# Patient Record
Sex: MALE | Race: WHITE | NOT HISPANIC OR LATINO | Employment: PART TIME | URBAN - METROPOLITAN AREA
[De-identification: names, ages, dates, MRNs, and addresses within clinical notes are randomized per-mention and may not be internally consistent; named-entity substitution may affect disease eponyms.]

---

## 2017-10-26 ENCOUNTER — HOSPITAL ENCOUNTER (INPATIENT)
Facility: MEDICAL CENTER | Age: 47
LOS: 2 days | DRG: 314 | End: 2017-10-29
Attending: EMERGENCY MEDICINE | Admitting: HOSPITALIST
Payer: MEDICAID

## 2017-10-26 ENCOUNTER — RESOLUTE PROFESSIONAL BILLING HOSPITAL PROF FEE (OUTPATIENT)
Dept: HOSPITALIST | Facility: MEDICAL CENTER | Age: 47
End: 2017-10-26
Payer: MEDICAID

## 2017-10-26 ENCOUNTER — APPOINTMENT (OUTPATIENT)
Dept: RADIOLOGY | Facility: MEDICAL CENTER | Age: 47
DRG: 314 | End: 2017-10-26
Attending: EMERGENCY MEDICINE
Payer: MEDICAID

## 2017-10-26 DIAGNOSIS — R06.02 SOB (SHORTNESS OF BREATH): ICD-10-CM

## 2017-10-26 DIAGNOSIS — R07.9 CHEST PAIN, UNSPECIFIED TYPE: ICD-10-CM

## 2017-10-26 LAB
ALBUMIN SERPL BCP-MCNC: 4.1 G/DL (ref 3.2–4.9)
ALBUMIN/GLOB SERPL: 1.3 G/DL
ALP SERPL-CCNC: 259 U/L (ref 30–99)
ALT SERPL-CCNC: 129 U/L (ref 2–50)
ANION GAP SERPL CALC-SCNC: 15 MMOL/L (ref 0–11.9)
AST SERPL-CCNC: 122 U/L (ref 12–45)
BASOPHILS # BLD AUTO: 1 % (ref 0–1.8)
BASOPHILS # BLD: 0.06 K/UL (ref 0–0.12)
BILIRUB SERPL-MCNC: 2.4 MG/DL (ref 0.1–1.5)
BNP SERPL-MCNC: 281 PG/ML (ref 0–100)
BUN SERPL-MCNC: 18 MG/DL (ref 8–22)
CALCIUM SERPL-MCNC: 9.1 MG/DL (ref 8.5–10.5)
CHLORIDE SERPL-SCNC: 104 MMOL/L (ref 96–112)
CO2 SERPL-SCNC: 18 MMOL/L (ref 20–33)
CREAT SERPL-MCNC: 0.78 MG/DL (ref 0.5–1.4)
EKG IMPRESSION: NORMAL
EKG IMPRESSION: NORMAL
EOSINOPHIL # BLD AUTO: 0.1 K/UL (ref 0–0.51)
EOSINOPHIL NFR BLD: 1.6 % (ref 0–6.9)
ERYTHROCYTE [DISTWIDTH] IN BLOOD BY AUTOMATED COUNT: 40.8 FL (ref 35.9–50)
EST. AVERAGE GLUCOSE BLD GHB EST-MCNC: 117 MG/DL
GFR SERPL CREATININE-BSD FRML MDRD: >60 ML/MIN/1.73 M 2
GLOBULIN SER CALC-MCNC: 3.2 G/DL (ref 1.9–3.5)
GLUCOSE SERPL-MCNC: 143 MG/DL (ref 65–99)
HAV IGM SERPL QL IA: NEGATIVE
HBA1C MFR BLD: 5.7 % (ref 0–5.6)
HBV CORE IGM SER QL: NEGATIVE
HBV SURFACE AG SER QL: NEGATIVE
HCT VFR BLD AUTO: 36.6 % (ref 42–52)
HCV AB SER QL: REACTIVE
HGB BLD-MCNC: 12.9 G/DL (ref 14–18)
IMM GRANULOCYTES # BLD AUTO: 0.01 K/UL (ref 0–0.11)
IMM GRANULOCYTES NFR BLD AUTO: 0.2 % (ref 0–0.9)
LIPASE SERPL-CCNC: 58 U/L (ref 11–82)
LYMPHOCYTES # BLD AUTO: 1.16 K/UL (ref 1–4.8)
LYMPHOCYTES NFR BLD: 18.9 % (ref 22–41)
MAGNESIUM SERPL-MCNC: 1.5 MG/DL (ref 1.5–2.5)
MCH RBC QN AUTO: 34.1 PG (ref 27–33)
MCHC RBC AUTO-ENTMCNC: 35.2 G/DL (ref 33.7–35.3)
MCV RBC AUTO: 96.8 FL (ref 81.4–97.8)
MONOCYTES # BLD AUTO: 0.62 K/UL (ref 0–0.85)
MONOCYTES NFR BLD AUTO: 10.1 % (ref 0–13.4)
NEUTROPHILS # BLD AUTO: 4.2 K/UL (ref 1.82–7.42)
NEUTROPHILS NFR BLD: 68.2 % (ref 44–72)
NRBC # BLD AUTO: 0 K/UL
NRBC BLD AUTO-RTO: 0 /100 WBC
PLATELET # BLD AUTO: 183 K/UL (ref 164–446)
PMV BLD AUTO: 9.4 FL (ref 9–12.9)
POTASSIUM SERPL-SCNC: 3.7 MMOL/L (ref 3.6–5.5)
PROT SERPL-MCNC: 7.3 G/DL (ref 6–8.2)
RBC # BLD AUTO: 3.78 M/UL (ref 4.7–6.1)
SODIUM SERPL-SCNC: 137 MMOL/L (ref 135–145)
TROPONIN I SERPL-MCNC: 0.04 NG/ML (ref 0–0.04)
TROPONIN I SERPL-MCNC: 0.05 NG/ML (ref 0–0.04)
TROPONIN I SERPL-MCNC: 0.05 NG/ML (ref 0–0.04)
TSH SERPL DL<=0.005 MIU/L-ACNC: 1.62 UIU/ML (ref 0.3–3.7)
WBC # BLD AUTO: 6.2 K/UL (ref 4.8–10.8)

## 2017-10-26 PROCEDURE — 700101 HCHG RX REV CODE 250: Performed by: FAMILY MEDICINE

## 2017-10-26 PROCEDURE — 83036 HEMOGLOBIN GLYCOSYLATED A1C: CPT

## 2017-10-26 PROCEDURE — 83880 ASSAY OF NATRIURETIC PEPTIDE: CPT

## 2017-10-26 PROCEDURE — 700102 HCHG RX REV CODE 250 W/ 637 OVERRIDE(OP): Performed by: EMERGENCY MEDICINE

## 2017-10-26 PROCEDURE — 93005 ELECTROCARDIOGRAM TRACING: CPT

## 2017-10-26 PROCEDURE — 71010 DX-CHEST-PORTABLE (1 VIEW): CPT

## 2017-10-26 PROCEDURE — 36415 COLL VENOUS BLD VENIPUNCTURE: CPT

## 2017-10-26 PROCEDURE — 84443 ASSAY THYROID STIM HORMONE: CPT

## 2017-10-26 PROCEDURE — 99285 EMERGENCY DEPT VISIT HI MDM: CPT

## 2017-10-26 PROCEDURE — A9270 NON-COVERED ITEM OR SERVICE: HCPCS

## 2017-10-26 PROCEDURE — 71275 CT ANGIOGRAPHY CHEST: CPT

## 2017-10-26 PROCEDURE — 700102 HCHG RX REV CODE 250 W/ 637 OVERRIDE(OP): Performed by: FAMILY MEDICINE

## 2017-10-26 PROCEDURE — 700111 HCHG RX REV CODE 636 W/ 250 OVERRIDE (IP): Performed by: FAMILY MEDICINE

## 2017-10-26 PROCEDURE — 96375 TX/PRO/DX INJ NEW DRUG ADDON: CPT

## 2017-10-26 PROCEDURE — 85025 COMPLETE CBC W/AUTO DIFF WBC: CPT

## 2017-10-26 PROCEDURE — 700111 HCHG RX REV CODE 636 W/ 250 OVERRIDE (IP): Performed by: HOSPITALIST

## 2017-10-26 PROCEDURE — 87522 HEPATITIS C REVRS TRNSCRPJ: CPT

## 2017-10-26 PROCEDURE — 93005 ELECTROCARDIOGRAM TRACING: CPT | Performed by: HOSPITALIST

## 2017-10-26 PROCEDURE — G0378 HOSPITAL OBSERVATION PER HR: HCPCS

## 2017-10-26 PROCEDURE — 93010 ELECTROCARDIOGRAM REPORT: CPT | Mod: 76 | Performed by: INTERNAL MEDICINE

## 2017-10-26 PROCEDURE — 700102 HCHG RX REV CODE 250 W/ 637 OVERRIDE(OP)

## 2017-10-26 PROCEDURE — 76705 ECHO EXAM OF ABDOMEN: CPT

## 2017-10-26 PROCEDURE — 96365 THER/PROPH/DIAG IV INF INIT: CPT

## 2017-10-26 PROCEDURE — 83690 ASSAY OF LIPASE: CPT

## 2017-10-26 PROCEDURE — HZ2ZZZZ DETOXIFICATION SERVICES FOR SUBSTANCE ABUSE TREATMENT: ICD-10-PCS | Performed by: HOSPITALIST

## 2017-10-26 PROCEDURE — A9270 NON-COVERED ITEM OR SERVICE: HCPCS | Performed by: HOSPITALIST

## 2017-10-26 PROCEDURE — A9270 NON-COVERED ITEM OR SERVICE: HCPCS | Performed by: FAMILY MEDICINE

## 2017-10-26 PROCEDURE — 99220 PR INITIAL OBSERVATION CARE,LEVL III: CPT | Performed by: HOSPITALIST

## 2017-10-26 PROCEDURE — 83735 ASSAY OF MAGNESIUM: CPT

## 2017-10-26 PROCEDURE — 93005 ELECTROCARDIOGRAM TRACING: CPT | Performed by: EMERGENCY MEDICINE

## 2017-10-26 PROCEDURE — 700102 HCHG RX REV CODE 250 W/ 637 OVERRIDE(OP): Performed by: HOSPITALIST

## 2017-10-26 PROCEDURE — 96376 TX/PRO/DX INJ SAME DRUG ADON: CPT

## 2017-10-26 PROCEDURE — 80053 COMPREHEN METABOLIC PANEL: CPT

## 2017-10-26 PROCEDURE — A9270 NON-COVERED ITEM OR SERVICE: HCPCS | Performed by: EMERGENCY MEDICINE

## 2017-10-26 PROCEDURE — 94760 N-INVAS EAR/PLS OXIMETRY 1: CPT

## 2017-10-26 PROCEDURE — 700117 HCHG RX CONTRAST REV CODE 255: Performed by: EMERGENCY MEDICINE

## 2017-10-26 PROCEDURE — 84484 ASSAY OF TROPONIN QUANT: CPT | Mod: 91

## 2017-10-26 PROCEDURE — 96366 THER/PROPH/DIAG IV INF ADDON: CPT

## 2017-10-26 PROCEDURE — 700105 HCHG RX REV CODE 258: Performed by: FAMILY MEDICINE

## 2017-10-26 PROCEDURE — 80074 ACUTE HEPATITIS PANEL: CPT

## 2017-10-26 RX ORDER — BISACODYL 10 MG
10 SUPPOSITORY, RECTAL RECTAL
Status: DISCONTINUED | OUTPATIENT
Start: 2017-10-26 | End: 2017-10-29 | Stop reason: HOSPADM

## 2017-10-26 RX ORDER — ASPIRIN 325 MG
325 TABLET ORAL DAILY
Status: DISCONTINUED | OUTPATIENT
Start: 2017-10-26 | End: 2017-10-28

## 2017-10-26 RX ORDER — ASPIRIN 81 MG/1
324 TABLET, CHEWABLE ORAL DAILY
Status: DISCONTINUED | OUTPATIENT
Start: 2017-10-26 | End: 2017-10-28

## 2017-10-26 RX ORDER — ASPIRIN 81 MG/1
81 TABLET, CHEWABLE ORAL DAILY
COMMUNITY

## 2017-10-26 RX ORDER — AMOXICILLIN 250 MG
2 CAPSULE ORAL 2 TIMES DAILY
Status: DISCONTINUED | OUTPATIENT
Start: 2017-10-26 | End: 2017-10-29 | Stop reason: HOSPADM

## 2017-10-26 RX ORDER — MORPHINE SULFATE 4 MG/ML
4 INJECTION, SOLUTION INTRAMUSCULAR; INTRAVENOUS
Status: DISCONTINUED | OUTPATIENT
Start: 2017-10-26 | End: 2017-10-29 | Stop reason: HOSPADM

## 2017-10-26 RX ORDER — ONDANSETRON 2 MG/ML
4 INJECTION INTRAMUSCULAR; INTRAVENOUS EVERY 4 HOURS PRN
Status: DISCONTINUED | OUTPATIENT
Start: 2017-10-26 | End: 2017-10-29 | Stop reason: HOSPADM

## 2017-10-26 RX ORDER — LORAZEPAM 2 MG/ML
1 INJECTION INTRAMUSCULAR
Status: DISCONTINUED | OUTPATIENT
Start: 2017-10-26 | End: 2017-10-29 | Stop reason: HOSPADM

## 2017-10-26 RX ORDER — THIAMINE MONONITRATE (VIT B1) 100 MG
50 TABLET ORAL DAILY
Status: DISCONTINUED | OUTPATIENT
Start: 2017-10-26 | End: 2017-10-29 | Stop reason: HOSPADM

## 2017-10-26 RX ORDER — PROMETHAZINE HYDROCHLORIDE 25 MG/1
12.5-25 TABLET ORAL EVERY 4 HOURS PRN
Status: DISCONTINUED | OUTPATIENT
Start: 2017-10-26 | End: 2017-10-29 | Stop reason: HOSPADM

## 2017-10-26 RX ORDER — ACETAMINOPHEN 325 MG/1
650 TABLET ORAL EVERY 6 HOURS PRN
Status: DISCONTINUED | OUTPATIENT
Start: 2017-10-26 | End: 2017-10-29 | Stop reason: HOSPADM

## 2017-10-26 RX ORDER — TEMAZEPAM 15 MG/1
15 CAPSULE ORAL
Status: DISCONTINUED | OUTPATIENT
Start: 2017-10-26 | End: 2017-10-29 | Stop reason: HOSPADM

## 2017-10-26 RX ORDER — LORAZEPAM 2 MG/ML
1.5 INJECTION INTRAMUSCULAR
Status: DISCONTINUED | OUTPATIENT
Start: 2017-10-26 | End: 2017-10-29 | Stop reason: HOSPADM

## 2017-10-26 RX ORDER — FOLIC ACID 1 MG/1
1 TABLET ORAL DAILY
Status: DISCONTINUED | OUTPATIENT
Start: 2017-10-26 | End: 2017-10-29 | Stop reason: HOSPADM

## 2017-10-26 RX ORDER — LORAZEPAM 1 MG/1
1 TABLET ORAL EVERY 4 HOURS PRN
Status: DISCONTINUED | OUTPATIENT
Start: 2017-10-26 | End: 2017-10-29 | Stop reason: HOSPADM

## 2017-10-26 RX ORDER — POLYETHYLENE GLYCOL 3350 17 G/17G
1 POWDER, FOR SOLUTION ORAL
Status: DISCONTINUED | OUTPATIENT
Start: 2017-10-26 | End: 2017-10-29 | Stop reason: HOSPADM

## 2017-10-26 RX ORDER — LORAZEPAM 1 MG/1
4 TABLET ORAL
Status: DISCONTINUED | OUTPATIENT
Start: 2017-10-26 | End: 2017-10-29 | Stop reason: HOSPADM

## 2017-10-26 RX ORDER — NITROGLYCERIN 0.4 MG/1
0.4 TABLET SUBLINGUAL
Status: DISCONTINUED | OUTPATIENT
Start: 2017-10-26 | End: 2017-10-29 | Stop reason: HOSPADM

## 2017-10-26 RX ORDER — LORAZEPAM 0.5 MG/1
0.5 TABLET ORAL EVERY 4 HOURS PRN
Status: DISCONTINUED | OUTPATIENT
Start: 2017-10-26 | End: 2017-10-29 | Stop reason: HOSPADM

## 2017-10-26 RX ORDER — OXYCODONE HYDROCHLORIDE 5 MG/1
5 TABLET ORAL
Status: DISCONTINUED | OUTPATIENT
Start: 2017-10-26 | End: 2017-10-29 | Stop reason: HOSPADM

## 2017-10-26 RX ORDER — NICOTINE 21 MG/24HR
14 PATCH, TRANSDERMAL 24 HOURS TRANSDERMAL
Status: DISCONTINUED | OUTPATIENT
Start: 2017-10-27 | End: 2017-10-29 | Stop reason: HOSPADM

## 2017-10-26 RX ORDER — METOPROLOL TARTRATE 1 MG/ML
5 INJECTION, SOLUTION INTRAVENOUS ONCE
Status: COMPLETED | OUTPATIENT
Start: 2017-10-26 | End: 2017-10-26

## 2017-10-26 RX ORDER — LORAZEPAM 2 MG/ML
2 INJECTION INTRAMUSCULAR
Status: DISCONTINUED | OUTPATIENT
Start: 2017-10-26 | End: 2017-10-29 | Stop reason: HOSPADM

## 2017-10-26 RX ORDER — ONDANSETRON 4 MG/1
4 TABLET, ORALLY DISINTEGRATING ORAL EVERY 4 HOURS PRN
Status: DISCONTINUED | OUTPATIENT
Start: 2017-10-26 | End: 2017-10-29 | Stop reason: HOSPADM

## 2017-10-26 RX ORDER — LORAZEPAM 2 MG/ML
0.5 INJECTION INTRAMUSCULAR EVERY 4 HOURS PRN
Status: DISCONTINUED | OUTPATIENT
Start: 2017-10-26 | End: 2017-10-29 | Stop reason: HOSPADM

## 2017-10-26 RX ORDER — LORAZEPAM 1 MG/1
2 TABLET ORAL
Status: DISCONTINUED | OUTPATIENT
Start: 2017-10-26 | End: 2017-10-29 | Stop reason: HOSPADM

## 2017-10-26 RX ORDER — LORAZEPAM 1 MG/1
3 TABLET ORAL
Status: DISCONTINUED | OUTPATIENT
Start: 2017-10-26 | End: 2017-10-29 | Stop reason: HOSPADM

## 2017-10-26 RX ORDER — OXYCODONE HYDROCHLORIDE 10 MG/1
10 TABLET ORAL
Status: DISCONTINUED | OUTPATIENT
Start: 2017-10-26 | End: 2017-10-29 | Stop reason: HOSPADM

## 2017-10-26 RX ORDER — PROMETHAZINE HYDROCHLORIDE 25 MG/1
12.5-25 SUPPOSITORY RECTAL EVERY 4 HOURS PRN
Status: DISCONTINUED | OUTPATIENT
Start: 2017-10-26 | End: 2017-10-29 | Stop reason: HOSPADM

## 2017-10-26 RX ORDER — LISINOPRIL 10 MG/1
5 TABLET ORAL
Status: DISCONTINUED | OUTPATIENT
Start: 2017-10-26 | End: 2017-10-27

## 2017-10-26 RX ORDER — ASPIRIN 300 MG/1
300 SUPPOSITORY RECTAL DAILY
Status: DISCONTINUED | OUTPATIENT
Start: 2017-10-26 | End: 2017-10-28

## 2017-10-26 RX ORDER — ALBUTEROL SULFATE 90 UG/1
2 AEROSOL, METERED RESPIRATORY (INHALATION) EVERY 4 HOURS PRN
Status: DISCONTINUED | OUTPATIENT
Start: 2017-10-26 | End: 2017-10-29 | Stop reason: HOSPADM

## 2017-10-26 RX ADMIN — LISINOPRIL 5 MG: 10 TABLET ORAL at 20:14

## 2017-10-26 RX ADMIN — ASPIRIN 325 MG: 325 TABLET, DELAYED RELEASE ORAL at 18:24

## 2017-10-26 RX ADMIN — IOHEXOL 100 ML: 350 INJECTION, SOLUTION INTRAVENOUS at 16:52

## 2017-10-26 RX ADMIN — LORAZEPAM 1 MG: 1 TABLET ORAL at 21:52

## 2017-10-26 RX ADMIN — ASPIRIN 325 MG: 325 TABLET, FILM COATED ORAL at 18:30

## 2017-10-26 RX ADMIN — FOLIC ACID 1 MG: 1 TABLET ORAL at 20:14

## 2017-10-26 RX ADMIN — NITROGLYCERIN 0.4 MG: 0.4 TABLET SUBLINGUAL at 23:02

## 2017-10-26 RX ADMIN — MORPHINE SULFATE 4 MG: 4 INJECTION INTRAVENOUS at 23:14

## 2017-10-26 RX ADMIN — NITROGLYCERIN 0.4 MG: 0.4 TABLET SUBLINGUAL at 22:51

## 2017-10-26 RX ADMIN — METOPROLOL TARTRATE 5 MG: 5 INJECTION INTRAVENOUS at 21:52

## 2017-10-26 RX ADMIN — ALBUTEROL SULFATE 2 PUFF: 90 AEROSOL, METERED RESPIRATORY (INHALATION) at 22:21

## 2017-10-26 RX ADMIN — Medication 50 MG: at 20:14

## 2017-10-26 RX ADMIN — MORPHINE SULFATE 4 MG: 4 INJECTION INTRAVENOUS at 20:15

## 2017-10-26 RX ADMIN — ONDANSETRON 4 MG: 2 INJECTION, SOLUTION INTRAMUSCULAR; INTRAVENOUS at 23:18

## 2017-10-26 RX ADMIN — NITROGLYCERIN 0.4 MG: 0.4 TABLET SUBLINGUAL at 23:11

## 2017-10-26 RX ADMIN — THERA TABS 1 TABLET: TAB at 20:15

## 2017-10-26 RX ADMIN — FOLIC ACID: 5 INJECTION, SOLUTION INTRAMUSCULAR; INTRAVENOUS; SUBCUTANEOUS at 22:17

## 2017-10-26 ASSESSMENT — LIFESTYLE VARIABLES
TOTAL SCORE: 0
TOTAL SCORE: 0
CONSUMPTION TOTAL: POSITIVE
ANXIETY: *
AVERAGE NUMBER OF DAYS PER WEEK YOU HAVE A DRINK CONTAINING ALCOHOL: 4
EVER FELT BAD OR GUILTY ABOUT YOUR DRINKING: NO
ANXIETY: *
TOTAL SCORE: 8
ORIENTATION AND CLOUDING OF SENSORIUM: ORIENTED AND CAN DO SERIAL ADDITIONS
TOTAL SCORE: 9
VISUAL DISTURBANCES: NOT PRESENT
EVER HAD A DRINK FIRST THING IN THE MORNING TO STEADY YOUR NERVES TO GET RID OF A HANGOVER: NO
HAVE YOU EVER FELT YOU SHOULD CUT DOWN ON YOUR DRINKING: NO
AGITATION: NORMAL ACTIVITY
AUDITORY DISTURBANCES: NOT PRESENT
HEADACHE, FULLNESS IN HEAD: VERY MILD
HEADACHE, FULLNESS IN HEAD: NOT PRESENT
VISUAL DISTURBANCES: NOT PRESENT
EVER_SMOKED: NEVER
AGITATION: NORMAL ACTIVITY
AUDITORY DISTURBANCES: NOT PRESENT
ON A TYPICAL DAY WHEN YOU DRINK ALCOHOL HOW MANY DRINKS DO YOU HAVE: 5
ALCOHOL_USE: YES
ORIENTATION AND CLOUDING OF SENSORIUM: ORIENTED AND CAN DO SERIAL ADDITIONS
HOW MANY TIMES IN THE PAST YEAR HAVE YOU HAD 5 OR MORE DRINKS IN A DAY: 3
EVER_SMOKED: NEVER
TREMOR: *
EVER_SMOKED: YES
DO YOU DRINK ALCOHOL: YES
TREMOR: TREMOR NOT VISIBLE BUT CAN BE FELT, FINGERTIP TO FINGERTIP
PAROXYSMAL SWEATS: BEADS OF SWEAT OBVIOUS ON FOREHEAD
PAROXYSMAL SWEATS: *
ALCOHOL_AMOUNT: 5
NAUSEA AND VOMITING: MILD NAUSEA WITH NO VOMITING
HAVE PEOPLE ANNOYED YOU BY CRITICIZING YOUR DRINKING: NO
TOTAL SCORE: 0
NAUSEA AND VOMITING: NO NAUSEA AND NO VOMITING

## 2017-10-26 ASSESSMENT — ENCOUNTER SYMPTOMS
HEMOPTYSIS: 1
NAUSEA: 1
CHILLS: 1
SPUTUM PRODUCTION: 1
ABDOMINAL PAIN: 0
SHORTNESS OF BREATH: 1
FEVER: 0
VOMITING: 1
DIAPHORESIS: 0
NECK PAIN: 1
COUGH: 1

## 2017-10-26 ASSESSMENT — PATIENT HEALTH QUESTIONNAIRE - PHQ9
SUM OF ALL RESPONSES TO PHQ QUESTIONS 1-9: 0
2. FEELING DOWN, DEPRESSED, IRRITABLE, OR HOPELESS: NOT AT ALL
SUM OF ALL RESPONSES TO PHQ9 QUESTIONS 1 AND 2: 0
1. LITTLE INTEREST OR PLEASURE IN DOING THINGS: NOT AT ALL

## 2017-10-26 ASSESSMENT — PAIN SCALES - GENERAL
PAINLEVEL_OUTOF10: 5
PAINLEVEL_OUTOF10: 3
PAINLEVEL_OUTOF10: 6
PAINLEVEL_OUTOF10: 7
PAINLEVEL_OUTOF10: 8
PAINLEVEL_OUTOF10: 7

## 2017-10-26 ASSESSMENT — COPD QUESTIONNAIRES
DURING THE PAST 4 WEEKS HOW MUCH DID YOU FEEL SHORT OF BREATH: MOST  OR ALL OF THE TIME
HAVE YOU SMOKED AT LEAST 100 CIGARETTES IN YOUR ENTIRE LIFE: NO/DON'T KNOW
DO YOU EVER COUGH UP ANY MUCUS OR PHLEGM?: YES, A FEW DAYS A WEEK OR MONTH
COPD SCREENING SCORE: 4

## 2017-10-26 NOTE — ED PROVIDER NOTES
ED Provider Note    Scribed for Trevin Sheikh M.D. by Girish Santana. 10/26/2017, 3:02 PM.    Means of arrival: Walk-in  History obtained from: Patient  History limited by: None    CHIEF COMPLAINT  Chief Complaint   Patient presents with   • Shortness of Breath       HPI  Kentrell Li is a 47 y.o. male who presents to the Emergency Department complaining of fatigue onset  three to four weeks ago three to four weeks ago Shortly followed by the onset of shortness of breath. He states that his shortness of breath is exacerbated by walking and even walking between rooms is difficult. He has a history of asthma and uses his step daughter's nebulizer symptomatically. He states that he has been using the nebulizer for his current symptoms and only experiences alleviation of the shortness of breath for about an hour per treatment. Patient then began to experience constant chest pain onset three weeks ago. He states that sitting down alleviates his chest pain and is worse with walking and accompanied by his shortness of breath. His chest pain radiates to his neck. The patient reports associated vomiting, nausea, productive cough, and hemoptysis. The sputum from his coughing is white and he is only producing specks of blood. He denies any upper extremity pain, diaphoresis, fever, chills, lower extremity pain or swelling, or abdominal pain. His last breathing treatment was several hours ago with good alleviation of his symptoms, including his chest pain. He states that he had a minor myocardial infarction in the past as indicated by an EKG.    REVIEW OF SYSTEMS  Review of Systems   Constitutional: Positive for chills and malaise/fatigue. Negative for diaphoresis and fever.   Respiratory: Positive for cough, hemoptysis, sputum production and shortness of breath.    Cardiovascular: Positive for chest pain. Negative for leg swelling. Claudication: or pain.   Gastrointestinal: Positive for nausea and vomiting. Negative  "for abdominal pain.   Musculoskeletal: Positive for neck pain.        Negative for upper extremity pain.   All other systems reviewed and are negative.  C    PAST MEDICAL HISTORY   has a past medical history of Asthma; Congestive heart failure (CMS-HCC); and Pancreatitis.    SURGICAL HISTORY   has a past surgical history that includes other abdominal surgery and other orthopedic surgery.    SOCIAL HISTORY  Social History   Substance Use Topics   • Smoking status: Never Smoker   • Smokeless tobacco: Current User     Types: Chew   • Alcohol use Yes      Comment: every other day      History   Drug Use No       FAMILY HISTORY  History reviewed. No pertinent family history.    CURRENT MEDICATIONS  No current facility-administered medications on file prior to encounter.      No current outpatient prescriptions on file prior to encounter.       ALLERGIES  No Known Allergies    PHYSICAL EXAM  VITAL SIGNS: /96   Pulse (!) 126   Temp 37.6 °C (99.6 °F)   Resp 16   Ht 1.778 m (5' 10\")   Wt 76.2 kg (168 lb)   SpO2 94%   BMI 24.11 kg/m²     Constitutional:   No acute distress  HENT:  Moist mucous membranes  Eyes:  No conjunctivitis or icterus  Neck:  trachea is midline, no palpable thyroid  Lymphatic:  No cervical lymphadenopathy  Cardiovascular:  Tachycardic, no murmur  Thorax & Lungs:  Normal breath sounds, no rhonchi  Abdomen:  Soft, Non-tender  Skin:.  no rash  Back:  Non-tender, no CVA tenderness  Extremities:   no edema  Vascular:  symmetric radial pulse  Neurologic:  Normal gross motor    LABS  Labs Reviewed   CBC WITH DIFFERENTIAL - Abnormal; Notable for the following:        Result Value    RBC 3.78 (*)     Hemoglobin 12.9 (*)     Hematocrit 36.6 (*)     MCH 34.1 (*)     Lymphocytes 18.90 (*)     All other components within normal limits   COMP METABOLIC PANEL - Abnormal; Notable for the following:     Co2 18 (*)     Anion Gap 15.0 (*)     Glucose 143 (*)     AST(SGOT) 122 (*)     ALT(SGPT) 129 (*)     " Alkaline Phosphatase 259 (*)     Total Bilirubin 2.4 (*)     All other components within normal limits   BTYPE NATRIURETIC PEPTIDE - Abnormal; Notable for the following:     B Natriuretic Peptide 281 (*)     All other components within normal limits   HEPATITIS PANEL ACUTE(4 COMPONENTS) - Abnormal; Notable for the following:     Hepatitis C Antibody Reactive (*)     All other components within normal limits   TROPONIN - Abnormal; Notable for the following:     Troponin I 0.05 (*)     All other components within normal limits   TROPONIN   ESTIMATED GFR   HEMOGLOBIN A1C   LIPASE   MAGNESIUM   TSH   HCV RNA QUANT, PCR   TROPONIN   HEPATITIS PANEL ACUTE(4 COMPONENTS)     All labs reviewed by me.    EKG Interpretation  Interpreted by me  Sinus tachycardia. Rate 123  Normal QTc  Normal QRS  Normal Axis  Unchanged from old EKG    RADIOLOGY  US-GALLBLADDER   Final Result      Fatty infiltration appearance of the liver. Otherwise negative right upper quadrant ultrasound.         CT-CTA CHEST PULMONARY ARTERY W/ RECONS   Final Result      1.  No evidence of pulmonary embolism.      2.  Clear lung parenchyma.            DX-CHEST-PORTABLE (1 VIEW)   Final Result         1. No acute cardiopulmonary abnormalities are identified.      NM-CARDIAC STRESS TEST    (Results Pending)   ECHOCARDIOGRAM COMP W/O CONT    (Results Pending)     The radiologist's interpretation of all radiological studies have been reviewed by me.    COURSE & MEDICAL DECISION MAKING  Pertinent Labs & Imaging studies reviewed. (See chart for details)    3:02 PM - Patient seen and examined at bedside. Ordered DX chest, CBC with differential, CMP, to evaluate his symptoms. The differential diagnoses include but are not limited to: Chest pain and shortness, rule out PE, pneumonia , pneumothorax, myocardial infarction     3:46 PM Review of laboratory results reveal elevated liver function and bilirubin, I will order an Ultrasound gallbladder and hepatitis panel  acute. Chest x ray is normal, I will order a CT chest pulmonary artery with recons to evaluate for PE.    4:31 PM Recheck: Patient re-evaluated at beside. Patient reports feeling better. Discussed patient's condition and treatment plan including admittance. Patient's lab and radiology results discussed. The patient understood and is in agreement.     5:31 PM Paged Hospitalist.     5:33 PM Recheck: Patient re-evaluated at beside. Updated the patient on his treatment plan. Patient's lab and radiology results discussed. The patient understood and is in agreement.     5:38 PM I discussed the patient's case and the above findings with Dr. Perla (Hospitalist) who agrees to transfer care of the patient at this time.      Medical Decision Making:  Patient has shortness of breath dyspnea on exertion and chest pain. Chest x-ray was clear troponin is normal so CT PE study was obtained. That's negative for PE. Patient is concerning for cardiac chest pain or contact hospitalist for admission.    DISPOSITION:  Patient will be admitted to Dr. Perla, Hospitalist, in guarded condition.    FINAL IMPRESSION  1. Chest pain, unspecified type    2. SOB (shortness of breath)          Girish MOORE (Scribe), am scribing for, and in the presence of, Trevin Sheikh M.D..    Electronically signed by: Girish Santana (Scribe), 10/26/2017    Trevin MOORE M.D. personally performed the services described in this documentation, as scribed by Girish Santana in my presence, and it is both accurate and complete.    The note accurately reflects work and decisions made by me.  Trevin Sheikh  10/26/2017  9:57 PM

## 2017-10-26 NOTE — ED NOTES
Med rec completed per pt at bedside  Allergies reviewed - NKDA  No ABX in last 30 days   No prescription medications

## 2017-10-27 ENCOUNTER — APPOINTMENT (OUTPATIENT)
Dept: RADIOLOGY | Facility: MEDICAL CENTER | Age: 47
DRG: 314 | End: 2017-10-27
Attending: HOSPITALIST
Payer: MEDICAID

## 2017-10-27 PROBLEM — I50.21 ACUTE SYSTOLIC CONGESTIVE HEART FAILURE (HCC): Status: ACTIVE | Noted: 2017-10-27

## 2017-10-27 PROBLEM — F10.10 ETOH ABUSE: Status: ACTIVE | Noted: 2017-10-27

## 2017-10-27 LAB
ANION GAP SERPL CALC-SCNC: 11 MMOL/L (ref 0–11.9)
BUN SERPL-MCNC: 16 MG/DL (ref 8–22)
CALCIUM SERPL-MCNC: 8.9 MG/DL (ref 8.5–10.5)
CHLORIDE SERPL-SCNC: 101 MMOL/L (ref 96–112)
CHOLEST SERPL-MCNC: 210 MG/DL (ref 100–199)
CO2 SERPL-SCNC: 22 MMOL/L (ref 20–33)
CREAT SERPL-MCNC: 0.71 MG/DL (ref 0.5–1.4)
EKG IMPRESSION: NORMAL
GFR SERPL CREATININE-BSD FRML MDRD: >60 ML/MIN/1.73 M 2
GLUCOSE SERPL-MCNC: 161 MG/DL (ref 65–99)
HDLC SERPL-MCNC: 110 MG/DL
LDLC SERPL CALC-MCNC: 89 MG/DL
LV EJECT FRACT  99904: 20
LV EJECT FRACT MOD 2C 99903: -5.28
LV EJECT FRACT MOD 4C 99902: 19.13
LV EJECT FRACT MOD BP 99901: 8.04
POTASSIUM SERPL-SCNC: 3.9 MMOL/L (ref 3.6–5.5)
SODIUM SERPL-SCNC: 134 MMOL/L (ref 135–145)
TRIGL SERPL-MCNC: 55 MG/DL (ref 0–149)
TROPONIN I SERPL-MCNC: 0.04 NG/ML (ref 0–0.04)

## 2017-10-27 PROCEDURE — 99233 SBSQ HOSP IP/OBS HIGH 50: CPT | Performed by: HOSPITALIST

## 2017-10-27 PROCEDURE — 80061 LIPID PANEL: CPT

## 2017-10-27 PROCEDURE — 96375 TX/PRO/DX INJ NEW DRUG ADDON: CPT

## 2017-10-27 PROCEDURE — 770020 HCHG ROOM/CARE - TELE (206)

## 2017-10-27 PROCEDURE — 90732 PPSV23 VACC 2 YRS+ SUBQ/IM: CPT | Performed by: HOSPITALIST

## 2017-10-27 PROCEDURE — 96366 THER/PROPH/DIAG IV INF ADDON: CPT

## 2017-10-27 PROCEDURE — 36415 COLL VENOUS BLD VENIPUNCTURE: CPT

## 2017-10-27 PROCEDURE — 90686 IIV4 VACC NO PRSV 0.5 ML IM: CPT | Performed by: HOSPITALIST

## 2017-10-27 PROCEDURE — 80048 BASIC METABOLIC PNL TOTAL CA: CPT

## 2017-10-27 PROCEDURE — 700102 HCHG RX REV CODE 250 W/ 637 OVERRIDE(OP): Performed by: FAMILY MEDICINE

## 2017-10-27 PROCEDURE — 90471 IMMUNIZATION ADMIN: CPT

## 2017-10-27 PROCEDURE — 3E0234Z INTRODUCTION OF SERUM, TOXOID AND VACCINE INTO MUSCLE, PERCUTANEOUS APPROACH: ICD-10-PCS | Performed by: HOSPITALIST

## 2017-10-27 PROCEDURE — 700102 HCHG RX REV CODE 250 W/ 637 OVERRIDE(OP): Performed by: HOSPITALIST

## 2017-10-27 PROCEDURE — 93306 TTE W/DOPPLER COMPLETE: CPT | Mod: 26 | Performed by: INTERNAL MEDICINE

## 2017-10-27 PROCEDURE — 700102 HCHG RX REV CODE 250 W/ 637 OVERRIDE(OP): Performed by: INTERNAL MEDICINE

## 2017-10-27 PROCEDURE — 93306 TTE W/DOPPLER COMPLETE: CPT

## 2017-10-27 PROCEDURE — A9270 NON-COVERED ITEM OR SERVICE: HCPCS | Performed by: FAMILY MEDICINE

## 2017-10-27 PROCEDURE — A9270 NON-COVERED ITEM OR SERVICE: HCPCS | Performed by: INTERNAL MEDICINE

## 2017-10-27 PROCEDURE — 93010 ELECTROCARDIOGRAM REPORT: CPT | Performed by: INTERNAL MEDICINE

## 2017-10-27 PROCEDURE — 93005 ELECTROCARDIOGRAM TRACING: CPT | Performed by: HOSPITALIST

## 2017-10-27 PROCEDURE — 700111 HCHG RX REV CODE 636 W/ 250 OVERRIDE (IP): Performed by: HOSPITALIST

## 2017-10-27 PROCEDURE — A9270 NON-COVERED ITEM OR SERVICE: HCPCS | Performed by: HOSPITALIST

## 2017-10-27 PROCEDURE — 84484 ASSAY OF TROPONIN QUANT: CPT

## 2017-10-27 PROCEDURE — 96376 TX/PRO/DX INJ SAME DRUG ADON: CPT

## 2017-10-27 RX ORDER — SPIRONOLACTONE 25 MG/1
25 TABLET ORAL
Status: DISCONTINUED | OUTPATIENT
Start: 2017-10-27 | End: 2017-10-29

## 2017-10-27 RX ORDER — DIGOXIN 125 MCG
125 TABLET ORAL DAILY
Status: DISCONTINUED | OUTPATIENT
Start: 2017-10-27 | End: 2017-10-29 | Stop reason: HOSPADM

## 2017-10-27 RX ORDER — LISINOPRIL 10 MG/1
10 TABLET ORAL
Status: DISCONTINUED | OUTPATIENT
Start: 2017-10-28 | End: 2017-10-29 | Stop reason: HOSPADM

## 2017-10-27 RX ORDER — MAGNESIUM SULFATE HEPTAHYDRATE 40 MG/ML
4 INJECTION, SOLUTION INTRAVENOUS ONCE
Status: COMPLETED | OUTPATIENT
Start: 2017-10-27 | End: 2017-10-27

## 2017-10-27 RX ADMIN — ENOXAPARIN SODIUM 40 MG: 100 INJECTION SUBCUTANEOUS at 17:10

## 2017-10-27 RX ADMIN — SPIRONOLACTONE 25 MG: 25 TABLET, FILM COATED ORAL at 10:19

## 2017-10-27 RX ADMIN — MAGNESIUM SULFATE IN WATER 4 G: 40 INJECTION, SOLUTION INTRAVENOUS at 10:25

## 2017-10-27 RX ADMIN — NICOTINE POLACRILEX 2 MG: 2 GUM, CHEWING BUCCAL at 01:59

## 2017-10-27 RX ADMIN — LORAZEPAM 1 MG: 1 TABLET ORAL at 10:19

## 2017-10-27 RX ADMIN — MORPHINE SULFATE 4 MG: 4 INJECTION INTRAVENOUS at 06:14

## 2017-10-27 RX ADMIN — MORPHINE SULFATE 4 MG: 4 INJECTION INTRAVENOUS at 11:26

## 2017-10-27 RX ADMIN — LORAZEPAM 1 MG: 1 TABLET ORAL at 01:59

## 2017-10-27 RX ADMIN — PNEUMOCOCCAL VACCINE POLYVALENT 25 MCG
25; 25; 25; 25; 25; 25; 25; 25; 25; 25; 25; 25; 25; 25; 25; 25; 25; 25; 25; 25; 25; 25; 25 INJECTION, SOLUTION INTRAMUSCULAR; SUBCUTANEOUS at 11:22

## 2017-10-27 RX ADMIN — TEMAZEPAM 15 MG: 15 CAPSULE ORAL at 22:23

## 2017-10-27 RX ADMIN — INFLUENZA A VIRUS A/MICHIGAN/45/2015 X-275 (H1N1) ANTIGEN (FORMALDEHYDE INACTIVATED), INFLUENZA A VIRUS A/HONG KONG/4801/2014 X-263B (H3N2) ANTIGEN (FORMALDEHYDE INACTIVATED), INFLUENZA B VIRUS B/PHUKET/3073/2013 ANTIGEN (FORMALDEHYDE INACTIVATED), AND INFLUENZA B VIRUS B/BRISBANE/60/2008 ANTIGEN (FORMALDEHYDE INACTIVATED) 0.5 ML: 15; 15; 15; 15 INJECTION, SUSPENSION INTRAMUSCULAR at 11:23

## 2017-10-27 RX ADMIN — ASPIRIN 325 MG: 325 TABLET, FILM COATED ORAL at 10:18

## 2017-10-27 RX ADMIN — MORPHINE SULFATE 4 MG: 4 INJECTION INTRAVENOUS at 22:23

## 2017-10-27 RX ADMIN — MORPHINE SULFATE 4 MG: 4 INJECTION INTRAVENOUS at 17:51

## 2017-10-27 RX ADMIN — ACETAMINOPHEN 650 MG: 325 TABLET, FILM COATED ORAL at 01:59

## 2017-10-27 RX ADMIN — Medication 50 MG: at 10:19

## 2017-10-27 RX ADMIN — DIGOXIN 125 MCG: 125 TABLET ORAL at 17:10

## 2017-10-27 RX ADMIN — FOLIC ACID 1 MG: 1 TABLET ORAL at 10:19

## 2017-10-27 RX ADMIN — THERA TABS 1 TABLET: TAB at 10:18

## 2017-10-27 ASSESSMENT — LIFESTYLE VARIABLES
TOTAL SCORE: 2
VISUAL DISTURBANCES: NOT PRESENT
AGITATION: NORMAL ACTIVITY
VISUAL DISTURBANCES: NOT PRESENT
ANXIETY: NO ANXIETY (AT EASE)
NAUSEA AND VOMITING: MILD NAUSEA WITH NO VOMITING
HEADACHE, FULLNESS IN HEAD: NOT PRESENT
AGITATION: NORMAL ACTIVITY
AUDITORY DISTURBANCES: NOT PRESENT
PAROXYSMAL SWEATS: BARELY PERCEPTIBLE SWEATING. PALMS MOIST
VISUAL DISTURBANCES: NOT PRESENT
AUDITORY DISTURBANCES: NOT PRESENT
TREMOR: *
PAROXYSMAL SWEATS: BARELY PERCEPTIBLE SWEATING. PALMS MOIST
TREMOR: TREMOR NOT VISIBLE BUT CAN BE FELT, FINGERTIP TO FINGERTIP
NAUSEA AND VOMITING: MILD NAUSEA WITH NO VOMITING
TOTAL SCORE: 8
TREMOR: *
HEADACHE, FULLNESS IN HEAD: NOT PRESENT
ANXIETY: MILDLY ANXIOUS
NAUSEA AND VOMITING: NO NAUSEA AND NO VOMITING
NAUSEA AND VOMITING: NO NAUSEA AND NO VOMITING
TOTAL SCORE: 8
ORIENTATION AND CLOUDING OF SENSORIUM: ORIENTED AND CAN DO SERIAL ADDITIONS
PAROXYSMAL SWEATS: *
AUDITORY DISTURBANCES: NOT PRESENT
PAROXYSMAL SWEATS: *
ORIENTATION AND CLOUDING OF SENSORIUM: ORIENTED AND CAN DO SERIAL ADDITIONS
ANXIETY: MILDLY ANXIOUS
AGITATION: NORMAL ACTIVITY
ORIENTATION AND CLOUDING OF SENSORIUM: ORIENTED AND CAN DO SERIAL ADDITIONS
TREMOR: TREMOR NOT VISIBLE BUT CAN BE FELT, FINGERTIP TO FINGERTIP
ANXIETY: NO ANXIETY (AT EASE)
HEADACHE, FULLNESS IN HEAD: NOT PRESENT
AGITATION: NORMAL ACTIVITY
VISUAL DISTURBANCES: NOT PRESENT
ORIENTATION AND CLOUDING OF SENSORIUM: ORIENTED AND CAN DO SERIAL ADDITIONS
AUDITORY DISTURBANCES: NOT PRESENT
TOTAL SCORE: 2
HEADACHE, FULLNESS IN HEAD: VERY MILD

## 2017-10-27 ASSESSMENT — PAIN SCALES - GENERAL
PAINLEVEL_OUTOF10: 2
PAINLEVEL_OUTOF10: 8
PAINLEVEL_OUTOF10: 7
PAINLEVEL_OUTOF10: 7
PAINLEVEL_OUTOF10: 3
PAINLEVEL_OUTOF10: 4
PAINLEVEL_OUTOF10: 4
PAINLEVEL_OUTOF10: 7

## 2017-10-27 ASSESSMENT — ENCOUNTER SYMPTOMS
NERVOUS/ANXIOUS: 1
CHILLS: 0
BACK PAIN: 0
HEADACHES: 0
SHORTNESS OF BREATH: 1
ABDOMINAL PAIN: 0
DIARRHEA: 0
LOSS OF CONSCIOUSNESS: 0
NAUSEA: 0
COUGH: 0
FEVER: 0
VOMITING: 0
DIZZINESS: 0

## 2017-10-27 ASSESSMENT — COPD QUESTIONNAIRES
HAVE YOU SMOKED AT LEAST 100 CIGARETTES IN YOUR ENTIRE LIFE: NO/DON'T KNOW
COPD SCREENING SCORE: 4
DURING THE PAST 4 WEEKS HOW MUCH DID YOU FEEL SHORT OF BREATH: MOST  OR ALL OF THE TIME
DO YOU EVER COUGH UP ANY MUCUS OR PHLEGM?: YES, A FEW DAYS A WEEK OR MONTH

## 2017-10-27 NOTE — PROGRESS NOTES
Pt transferred to T 735/1, All personal belongings sent with pt, Chart and medication sent to floor. Rally bag Iv fluids continued to floor for completion.

## 2017-10-27 NOTE — PROGRESS NOTES
Labs collected and sent. Pt resting comfortably in bed at this time. Denies chest pain. Tele: sinus tach at 103 bpm with occasional PVCs

## 2017-10-27 NOTE — PROGRESS NOTES
Pt arrived to floor, verified with monitor room that patient is on.  Pt has a 3/10 chest pain, but was just medicated.  No complaints, no requests except a nap.

## 2017-10-27 NOTE — PROGRESS NOTES
Pt up to unit by jonathan. Pt is A&Ox4. Denies SOB but c/o chest pain to center of chest, non radiating. Pt states chest discomfort has been present x2 weeks. Rates 7/10. EKG ordered. PRN pain rx given-see MAR. Tele initiated: sinus tachy 110-120 bpm. Friend at bedside. Admission profile completed. Pt updated on POC. Call light in reach, will continue to monitor.

## 2017-10-27 NOTE — ASSESSMENT & PLAN NOTE
Likely ETOH induced, recent negative cad w/u  -add IV lasix 40 mg daily  -continue aldactone, toprol XL 25 mg daiyl, lisinopril 10 mg daily

## 2017-10-27 NOTE — PROGRESS NOTES
Pt continues to c/o chest discomfort 7/10 without relief from morphine. MD notified with order for nitro received.

## 2017-10-27 NOTE — PROGRESS NOTES
Bedside report received 0720. POC discussed with pt; Pt c/o chest pain, medicated per MAR; Ciwa protocol in place; all questions answered at this time.

## 2017-10-27 NOTE — PROGRESS NOTES
Skin assessment completed with Clyde MASTERSON, no skin breakdown noted,  Various scabs, calloused feet, dry and cracked but no open wounds

## 2017-10-27 NOTE — PROGRESS NOTES
[facility] Hospitalist Progress Note    Date of Service: 10/27/2017    Chief Complaint  47 y.o. male admitted 10/26/2017 with SOB and CP.  Hx of CHF with LVEF of 30% one year ago but stopped his meds; no clear reason why when querried    Interval Problem Update  Feels about the same.  SOB, occasional stabbing CP but only for a few seconds.  Some SOB at night    Consultants/Specialty  Card's consulted Dr Burns    Disposition  Change to inpt and transfer to Tele        Review of Systems   Constitutional: Negative for chills and fever.   Respiratory: Positive for shortness of breath. Negative for cough.    Cardiovascular: Positive for chest pain.   Gastrointestinal: Negative for abdominal pain, diarrhea, nausea and vomiting.   Musculoskeletal: Negative for back pain.   Skin: Negative for rash.   Neurological: Negative for dizziness, loss of consciousness and headaches.   Psychiatric/Behavioral: The patient is nervous/anxious.       Physical Exam  Laboratory/Imaging   Hemodynamics  Temp (24hrs), Av.4 °C (97.6 °F), Min:36 °C (96.8 °F), Max:37.2 °C (98.9 °F)   Temperature: 36 °C (96.8 °F)  Pulse  Av.1  Min: 99  Max: 126 Heart Rate (Monitored): (!) 0  Blood Pressure: 125/75, NIBP: 147/109      Respiratory      Respiration: 18, Pulse Oximetry: 95 %, O2 Daily Delivery Respiratory : Room Air with O2 Available     Work Of Breathing / Effort: Mild (pain with deep breathes)  RUL Breath Sounds: Clear, RML Breath Sounds: Clear, RLL Breath Sounds: Clear, PENNY Breath Sounds: Clear, LLL Breath Sounds: Clear    Fluids    Intake/Output Summary (Last 24 hours) at 10/27/17 1506  Last data filed at 10/27/17 0225   Gross per 24 hour   Intake                0 ml   Output              550 ml   Net             -550 ml       Nutrition  Orders Placed This Encounter   Procedures   • Protocol 1312 Diet Order: Reg, No Decaf, No Caffeine - Cardiac Stress Test Treadmill with Isotope     Standing Status:   Standing     Number of Occurrences:   1      Order Specific Question:   Diet:     Answer:   Regular [1]     Order Specific Question:   Miscellaneous modifications:     Answer:   No Decaf, No Caffeine(for test) [11]     Comments:   Protocol 1312 No caffeine for 12 hours prior to exam (decaf, coffee, cola, tea, chocolate)     Physical Exam   Constitutional: He is oriented to person, place, and time. He appears well-developed and well-nourished. No distress.   HENT:   Head: Normocephalic and atraumatic.   Eyes: Conjunctivae are normal.   Neck: No JVD present.   Cardiovascular: Normal rate.  Exam reveals no gallop.    Murmur heard.  Pulmonary/Chest: Effort normal. No stridor. No respiratory distress. He has no wheezes. He has no rales.   Abdominal: Soft. There is no tenderness. There is no rebound and no guarding.   Musculoskeletal: He exhibits no edema.   Neurological: He is oriented to person, place, and time.   Skin: Skin is warm and dry. No rash noted. He is not diaphoretic.   Psychiatric: He has a normal mood and affect. Thought content normal.   Nursing note and vitals reviewed.      Recent Labs      10/26/17   1507   WBC  6.2   RBC  3.78*   HEMOGLOBIN  12.9*   HEMATOCRIT  36.6*   MCV  96.8   MCH  34.1*   MCHC  35.2   RDW  40.8   PLATELETCT  183   MPV  9.4     Recent Labs      10/26/17   1507  10/27/17   0331   SODIUM  137  134*   POTASSIUM  3.7  3.9   CHLORIDE  104  101   CO2  18*  22   GLUCOSE  143*  161*   BUN  18  16   CREATININE  0.78  0.71   CALCIUM  9.1  8.9         Recent Labs      10/26/17   1507   BNPBTYPENAT  281*     Recent Labs      10/27/17   0331   TRIGLYCERIDE  55   HDL  110   LDL  89          Assessment/Plan     Uncontrolled hypertension- (present on admission)   Assessment & Plan    Have restarted lisinopril  Add BB as pressures and acute failure allow        Acute systolic congestive heart failure (CMS-HCC)   Assessment & Plan    Diurese  Decrease after load        ETOH abuse   Assessment & Plan    Thiamine  Folate  MVI  CIWA  protocol  Encourage cesation        Chest pain- (present on admission)   Assessment & Plan    Work up as noted        Cardiomyopathy (CMS-HCC)- (present on admission)   Assessment & Plan    ?ETOH  Card's consulted  Hold on further ischemic w/u pending Card's in put  On ACEI  Adding in BB and aldactone as able            Reviewed items::  Radiology images reviewed, EKG reviewed, Labs reviewed and Medications reviewed

## 2017-10-27 NOTE — DISCHARGE PLANNING
Care Transition Team Assessment    Information Source  Orientation : Oriented x 4  Information Given By: Patient  Who is responsible for making decisions for patient? : Patient         Elopement Risk  Legal Hold: No  Ambulatory or Self Mobile in Wheelchair: No-Not an Elopement Risk    Interdisciplinary Discharge Planning  Primary Care Physician:  (referral to )  Lives with - Patient's Self Care Capacity: Spouse  Support Systems: Spouse / Significant Other  Able to Return to Previous ADL's: Yes  Mobility Issues: No              Finances  Financial Barriers to Discharge: Yes  Prescription Coverage: No                             Discharge Risks or Barriers  Discharge risks or barriers?: No PCP, Uninsured / underinsured    Anticipated Discharge Information  Anticipated discharge disposition: Home  Discharge Address: see face sheet

## 2017-10-27 NOTE — CONSULTS
Cardiology Consult Note:    Xu Burns  Date & Time note created:    10/27/2017   9:56 AM     Referring MD:  Dr. Quinn    Patient ID:   Name:             Kentrell Li     YOB: 1970  Age:                 47 y.o.  male   MRN:               [id number]                                                             Reason for Consult:      CHF    History of Present Illness:    47-year-old male with a past medical history of systolic heart failure with reduced ejection fraction. He was here about one year ago for this presentation. During that admission he underwent a coronary angiogram showing no obstructive coronary disease. Of note he is an alcoholic and drinks all day long. He says that he doesn't need to drink but he does it to avoid developing shakes and tremors. He presents today with 3 weeks of progressive onset weakness fatigue shortness of breath and chest pain. The chest pain radiates up into his neck and is present all day long. His ECG is essentially unchanged from before. His troponin is detectable but indeterminate likely from his heart failure. A repeat echocardiogram showed progression of his heart failure now to an EF of approximately 15-20%. He stopped taking his heart failure medications because he simply decided not to keep taking them. 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    Review of Systems:      Constitutional: Denies fevers, Denies weight changes  Eyes: Denies changes in vision, no eye pain  Ears/Nose/Throat/Mouth: Denies nasal congestion or sore throat   Cardiovascular: +chest pain, no palpitations   Respiratory: + shortness of breath , Denies cough  Gastrointestinal/Hepatic: Denies abdominal pain, nausea, vomiting, diarrhea, constipation or GI bleeding   Genitourinary: Denies dysuria or frequency  Musculoskeletal/Rheum: Denies  joint pain and swelling, no edema  Skin: Denies rash  Neurological: Denies headache, confusion, memory loss or focal  weakness/parasthesias  Psychiatric: denies mood disorder   Endocrine: Lisa thyroid problems  Heme/Oncology/Lymph Nodes: Denies enlarged lymph nodes, denies brusing or known bleeding disorder  All other systems were reviewed and are negative (AMA/CMS criteria)                Past Medical History:   Past Medical History:   Diagnosis Date   • Asthma    • Congestive heart failure (CMS-HCC)    • Pancreatitis      Active Hospital Problems    Diagnosis   • Uncontrolled hypertension [I10]     Priority: Medium   • ETOH abuse [F10.10]   • Chest pain [R07.9]   • Cardiomyopathy (CMS-HCC) [I42.9]       Past Surgical History:  Past Surgical History:   Procedure Laterality Date   • OTHER ABDOMINAL SURGERY      gall bladder   • OTHER ORTHOPEDIC SURGERY      ankle, back surgery x 2       Hospital Medications:  Current Facility-Administered Medications   Medication Dose   • magnesium sulfate IVPB premix 4 g  4 g   • [START ON 10/28/2017] lisinopril (PRINIVIL) 10 MG tablet 10 mg  10 mg   • aspirin (ASA) tablet 325 mg  325 mg    Or   • aspirin (ASA) chewable tab 324 mg  324 mg    Or   • aspirin (ASA) suppository 300 mg  300 mg   • senna-docusate (PERICOLACE or SENOKOT S) 8.6-50 MG per tablet 2 Tab  2 Tab    And   • polyethylene glycol/lytes (MIRALAX) PACKET 1 Packet  1 Packet    And   • magnesium hydroxide (MILK OF MAGNESIA) suspension 30 mL  30 mL    And   • bisacodyl (DULCOLAX) suppository 10 mg  10 mg   • Respiratory Care per Protocol     • acetaminophen (TYLENOL) tablet 650 mg  650 mg   • Pharmacy Consult Request ...Pain Management Review      And   • oxycodone immediate-release (ROXICODONE) tablet 5 mg  5 mg    And   • oxycodone immediate release (ROXICODONE) tablet 10 mg  10 mg    And   • morphine (pf) 4 mg/ml injection 4 mg  4 mg   • ondansetron (ZOFRAN) syringe/vial injection 4 mg  4 mg   • ondansetron (ZOFRAN ODT) dispertab 4 mg  4 mg   • promethazine (PHENERGAN) tablet 12.5-25 mg  12.5-25 mg   • promethazine (PHENERGAN)  suppository 12.5-25 mg  12.5-25 mg   • prochlorperazine (COMPAZINE) injection 5-10 mg  5-10 mg   • temazepam (RESTORIL) capsule 15 mg  15 mg   • nicotine (NICODERM) 14 MG/24HR 14 mg  14 mg    And   • nicotine polacrilex (NICORETTE) 2 MG piece 2 mg  2 mg   • thiamine (THIAMINE) tablet 50 mg  50 mg   • folic acid (FOLVITE) tablet 1 mg  1 mg   • multivitamin (THERAGRAN) tablet 1 Tab  1 Tab   • albuterol inhaler 2 Puff  2 Puff   • Influenza Vaccine Quad pf injection 0.5 mL  0.5 mL   • pneumococcal vaccine (PNEUMOVAX-23) injection 25 mcg  0.5 mL   • lorazepam (ATIVAN) tablet 0.5 mg  0.5 mg   • lorazepam (ATIVAN) tablet 1 mg  1 mg    Or   • lorazepam (ATIVAN) injection 0.5 mg  0.5 mg   • lorazepam (ATIVAN) tablet 2 mg  2 mg    Or   • lorazepam (ATIVAN) injection 1 mg  1 mg   • lorazepam (ATIVAN) tablet 3 mg  3 mg    Or   • lorazepam (ATIVAN) injection 1.5 mg  1.5 mg   • lorazepam (ATIVAN) tablet 4 mg  4 mg    Or   • lorazepam (ATIVAN) injection 2 mg  2 mg   • nitroglycerin (NITROSTAT) tablet 0.4 mg  0.4 mg         Current Outpatient Medications:  Prescriptions Prior to Admission   Medication Sig Dispense Refill Last Dose   • aspirin (ASA) 81 MG Chew Tab chewable tablet Take 81 mg by mouth every day.   10/25/2017 at unknown       Medication Allergy:  No Known Allergies    Family History:  History reviewed. No pertinent family history.    Social History:  Social History     Social History   • Marital status: Single     Spouse name: N/A   • Number of children: N/A   • Years of education: N/A     Occupational History   • Not on file.     Social History Main Topics   • Smoking status: Never Smoker   • Smokeless tobacco: Current User     Types: Chew   • Alcohol use Yes      Comment: every other day   • Drug use: No   • Sexual activity: Not on file     Other Topics Concern   • Not on file     Social History Narrative   • No narrative on file         Physical Exam:  Vitals/ General Appearance:   Weight/BMI: Body mass index is  "22.97 kg/m².  Blood pressure 125/75, pulse 99, temperature 36 °C (96.8 °F), resp. rate 18, height 1.778 m (5' 10\"), weight 72.6 kg (160 lb 0.9 oz), SpO2 95 %.  Vitals:    10/27/17 0154 10/27/17 0414 10/27/17 0500 10/27/17 0718   BP: 149/92 137/88  125/75   Pulse: (!) 104 (!) 102 99 99   Resp:  18  18   Temp:  36.1 °C (96.9 °F)  36 °C (96.8 °F)   SpO2:  94%  95%   Weight:       Height:         Oxygen Therapy:  Pulse Oximetry: 95 %, O2 (LPM): 0, O2 Delivery: None (Room Air)    Constitutional:   Well developed, Well nourished, No acute distress  HENMT:  Normocephalic, Atraumatic, Oropharynx moist mucous membranes, No oral exudates, Nose normal.  No thyromegaly.  Eyes:  EOMI, Conjunctiva normal, No discharge.  Neck:  Normal range of motion, No cervical tenderness,  no JVD.  Cardiovascular:  Normal heart rate, Normal rhythm, No murmurs, No rubs, No gallops.   Extremitites with intact distal pulses, no cyanosis, or edema.  Lungs:  Normal breath sounds, breath sounds clear to auscultation bilaterally,  no crackles, no wheezing.   Abdomen: Bowel sounds normal, Soft, No tenderness, No guarding, No rebound, No masses, No hepatosplenomegaly.  Skin: Warm, Dry, No erythema, No rash, no induration.  Neurologic: Alert & oriented x 3, No focal deficits noted, cranial nerves II through X are grossly intact.  Psychiatric: Affect normal, Judgment normal, Mood normal.      MDM (Data Review):     Records reviewed and summarized in current documentation    Lab Data Review:  Recent Results (from the past 24 hour(s))   EKG (NOW)    Collection Time: 10/26/17  2:43 PM   Result Value Ref Range    Report       [facility] Emergency Dept.    Test Date:  2017-10-26  Pt Name:    LILIANA STAUFFER                  Department: ER  MRN:        [id number]                      Room:  Gender:     M                            Technician: [id number]  :        1970                   Requested By:ER TRIAGE PROTOCOL  Order #:    [id number]      "               Reading MD:    Measurements  Intervals                                Axis  Rate:       123                          P:          54  MD:         140                          QRS:        83  QRSD:       110                          T:          142  QT:         344  QTc:        492    Interpretive Statements  SINUS TACHYCARDIA  PROBABLE LEFT ATRIAL ABNORMALITY  INCOMPLETE LEFT BUNDLE BRANCH BLOCK  LEFT VENTRICULAR HYPERTROPHY  ANTERIOR INJURY, EARLY ACUTE INFARCT  Compared to ECG 10/19/2016 05:28:11  Left ventricular hypertrophy now present  Sinus rhythm no longer present  Myocardial infarct finding still present     CBC WITH DIFFERENTIAL    Collection Time: 10/26/17  3:07 PM   Result Value Ref Range    WBC 6.2 4.8 - 10.8 K/uL    RBC 3.78 (L) 4.70 - 6.10 M/uL    Hemoglobin 12.9 (L) 14.0 - 18.0 g/dL    Hematocrit 36.6 (L) 42.0 - 52.0 %    MCV 96.8 81.4 - 97.8 fL    MCH 34.1 (H) 27.0 - 33.0 pg    MCHC 35.2 33.7 - 35.3 g/dL    RDW 40.8 35.9 - 50.0 fL    Platelet Count 183 164 - 446 K/uL    MPV 9.4 9.0 - 12.9 fL    Neutrophils-Polys 68.20 44.00 - 72.00 %    Lymphocytes 18.90 (L) 22.00 - 41.00 %    Monocytes 10.10 0.00 - 13.40 %    Eosinophils 1.60 0.00 - 6.90 %    Basophils 1.00 0.00 - 1.80 %    Immature Granulocytes 0.20 0.00 - 0.90 %    Nucleated RBC 0.00 /100 WBC    Neutrophils (Absolute) 4.20 1.82 - 7.42 K/uL    Lymphs (Absolute) 1.16 1.00 - 4.80 K/uL    Monos (Absolute) 0.62 0.00 - 0.85 K/uL    Eos (Absolute) 0.10 0.00 - 0.51 K/uL    Baso (Absolute) 0.06 0.00 - 0.12 K/uL    Immature Granulocytes (abs) 0.01 0.00 - 0.11 K/uL    NRBC (Absolute) 0.00 K/uL   COMP METABOLIC PANEL    Collection Time: 10/26/17  3:07 PM   Result Value Ref Range    Sodium 137 135 - 145 mmol/L    Potassium 3.7 3.6 - 5.5 mmol/L    Chloride 104 96 - 112 mmol/L    Co2 18 (L) 20 - 33 mmol/L    Anion Gap 15.0 (H) 0.0 - 11.9    Glucose 143 (H) 65 - 99 mg/dL    Bun 18 8 - 22 mg/dL    Creatinine 0.78 0.50 - 1.40 mg/dL    Calcium 9.1 8.5 -  10.5 mg/dL    AST(SGOT) 122 (H) 12 - 45 U/L    ALT(SGPT) 129 (H) 2 - 50 U/L    Alkaline Phosphatase 259 (H) 30 - 99 U/L    Total Bilirubin 2.4 (H) 0.1 - 1.5 mg/dL    Albumin 4.1 3.2 - 4.9 g/dL    Total Protein 7.3 6.0 - 8.2 g/dL    Globulin 3.2 1.9 - 3.5 g/dL    A-G Ratio 1.3 g/dL   TROPONIN    Collection Time: 10/26/17  3:07 PM   Result Value Ref Range    Troponin I 0.04 0.00 - 0.04 ng/mL   BTYPE NATRIURETIC PEPTIDE    Collection Time: 10/26/17  3:07 PM   Result Value Ref Range    B Natriuretic Peptide 281 (H) 0 - 100 pg/mL   ESTIMATED GFR    Collection Time: 10/26/17  3:07 PM   Result Value Ref Range    GFR If African American >60 >60 mL/min/1.73 m 2    GFR If Non African American >60 >60 mL/min/1.73 m 2   HEPATITIS PANEL ACUTE(4 COMPONENTS)    Collection Time: 10/26/17  3:07 PM   Result Value Ref Range    Hepatitis B Surface Antigen Negative Negative    Hepatitis B Cors Ab,IgM Negative Negative    Hepatitis A Virus Ab, IgM Negative Negative    Hepatitis C Antibody Reactive (A) Negative   HEMOGLOBIN A1C    Collection Time: 10/26/17  3:07 PM   Result Value Ref Range    Glycohemoglobin 5.7 (H) 0.0 - 5.6 %    Est Avg Glucose 117 mg/dL   TSH    Collection Time: 10/26/17  3:07 PM   Result Value Ref Range    TSH 1.620 0.300 - 3.700 uIU/mL   Troponin - STAT Once    Collection Time: 10/26/17  6:33 PM   Result Value Ref Range    Troponin I 0.05 (H) 0.00 - 0.04 ng/mL   LIPASE    Collection Time: 10/26/17  6:33 PM   Result Value Ref Range    Lipase 58 11 - 82 U/L   MAGNESIUM    Collection Time: 10/26/17  6:33 PM   Result Value Ref Range    Magnesium 1.5 1.5 - 2.5 mg/dL   EKG (IP)    Collection Time: 10/26/17  8:26 PM   Result Value Ref Range    Report       [facility]    Test Date:  2017-10-26  Pt Name:    LILIANA PERKINSVINCENZO                  Department: CPU  MRN:        [id number]                      Room:       [facility]  Gender:     M                            Technician: SINA  :        1970                   Requested  By:MILLER CHAU  Order #:    [id number]                    Reading MD:    Measurements  Intervals                                Axis  Rate:       118                          P:          0  MS:         129                          QRS:        86  QRSD:       118                          T:          230  QT:         388  QTc:        544    Interpretive Statements  SINUS TACHYCARDIA  PROBABLE LEFT ATRIAL ABNORMALITY  NONSPECIFIC INTRAVENTRICULAR CONDUCTION DELAY  LEFT VENTRICULAR HYPERTROPHY  Compared to ECG 10/26/2017 14:43:13  Intraventricular conduction delay now present  Left bundle-branch block no longer present  Myocardial infarct finding no longer present     EKG    Collection Time: 10/26/17  8:26 PM   Result Value Ref Range    Report       [facility]    Test Date:  2017-10-26  Pt Name:    LILIANA STAUFFER                  Department: CPU  MRN:        [id number]                      Room:       [facility]  Gender:     M                            Technician: [facility]  :        1970                   Requested By:MILLER CHAU  Order #:    [id number]                    Reading MD: Xu Burns MD    Measurements  Intervals                                Axis  Rate:       118                          P:          0  MS:         129                          QRS:        86  QRSD:       118                          T:          230  QT:         388  QTc:        544    Interpretive Statements  SINUS TACHYCARDIA  PROBABLE LEFT ATRIAL ABNORMALITY  NONSPECIFIC INTRAVENTRICULAR CONDUCTION DELAY  LEFT VENTRICULAR HYPERTROPHY  Compared to ECG 10/26/2017 14:43:13  Intraventricular conduction delay now present  Left bundle-branch block no longer present  Myocardial infarct finding no longer present    Electronically Signed  On 10- 8:54:50 PDT by Xu Burns MD     Troponin in four (4) hours    Collection Time: 10/26/17 10:45 PM   Result Value Ref Range    Troponin I 0.05 (H) 0.00 - 0.04  ng/mL   EKG    Collection Time: 10/26/17 10:55 PM   Result Value Ref Range    Report       [facility]    Test Date:  2017-10-26  Pt Name:    LILIANA STAUFFER                  Department: CPU  MRN:        [id number]                      Room:       T211  Gender:     M                            Technician: SINA  :        1970                   Requested By:MILLER CHAU  Order #:    [id number]                    Reading MD: Xu Burns MD    Measurements  Intervals                                Axis  Rate:       105                          P:          43  ID:         156                          QRS:        73  QRSD:       116                          T:          110  QT:         368  QTc:        487    Interpretive Statements  SINUS TACHYCARDIA  NONSPECIFIC INTRAVENTRICULAR CONDUCTION DELAY  PROBABLE LEFT VENTRICULAR HYPERTROPHY  ANTERIOR INFARCT, AGE INDETERMINATE  Compared to ECG 10/26/2017 20:26:54  Q waves now present    Electronically Signed On 10- 8:55:22 PDT by Xu Burns MD     Basic Metabolic Panel (BMP)    Collection Time: 10/27/17  3:31 AM   Result Value Ref Range    Sodium 134 (L) 135 - 145 mmol/L    Potassium 3.9 3.6 - 5.5 mmol/L    Chloride 101 96 - 112 mmol/L    Co2 22 20 - 33 mmol/L    Glucose 161 (H) 65 - 99 mg/dL    Bun 16 8 - 22 mg/dL    Creatinine 0.71 0.50 - 1.40 mg/dL    Calcium 8.9 8.5 - 10.5 mg/dL    Anion Gap 11.0 0.0 - 11.9   Lipid Profile (Lipid Panel) Fasting    Collection Time: 10/27/17  3:31 AM   Result Value Ref Range    Cholesterol,Tot 210 (H) 100 - 199 mg/dL    Triglycerides 55 0 - 149 mg/dL     >=40 mg/dL    LDL 89 <100 mg/dL   TROPONIN    Collection Time: 10/27/17  3:31 AM   Result Value Ref Range    Troponin I 0.04 0.00 - 0.04 ng/mL   ESTIMATED GFR    Collection Time: 10/27/17  3:31 AM   Result Value Ref Range    GFR If African American >60 >60 mL/min/1.73 m 2    GFR If Non African American >60 >60 mL/min/1.73 m 2   EKG (IP)     Collection Time: 10/27/17  3:52 AM   Result Value Ref Range    Report       [facility]    Test Date:  2017-10-27  Pt Name:    LILIANA STAUFFER                  Department: CPU  MRN:        [id number]                      Room:       T211  Gender:     M                            Technician: SINA  :        1970                   Requested By:MILLER CHAU  Order #:    [id number]                    Reading MD: Murali Polo MD    Measurements  Intervals                                Axis  Rate:       97                           P:          19  [place]:         156                          QRS:        83  QRSD:       116                          T:          114  QT:         400  QTc:        508    Interpretive Statements  SINUS RHYTHM  NONSPECIFIC INTRAVENTRICULAR CONDUCTION DELAY    Electronically Signed On 10- 6:44:07 PDT by Murali Polo MD         Imaging/Procedures Review:    Chest Xray:  Reviewed    EKG:   As in HPI.     ECHO:  Per HPI    MDM (Assessment and Plan):     Active Hospital Problems    Diagnosis   • Uncontrolled hypertension [I10]     Priority: Medium   • ETOH abuse [F10.10]   • Chest pain [R07.9]   • Cardiomyopathy (CMS-HCC) [I42.9]     47-year-old male with heart failure with reduced ejection fraction and a detectable troponin in the setting of an abnormal ECG. The fact that his coronary angiogram last year was unremarkable is reassuring. I would doubt that he has had progression of his underlying heart disease to develop obstructive coronary disease. I don't think that he is a candidate for repeat catheterization this year and I don't think that that is the cause. I think that he has been drinking and I think that has led to his progression of underlying disease along with his medical noncompliance. At this point we will add on digoxin and spironolactone to his current medication regimen. We will hold beta blocker while he is in his decompensated state. He also needs  to be seen by social work to talk about treatment or rehabilitation for his alcoholism.    Of note, his troponin does not represent ACS and I would not recommend treating him for ACS. This is likely subendocardial ishemia from high LV filling pressures best managed with decongestive therapy.    1. CHFrEF, EF 15%, Stage C, Hem Pro B (Wet and Warm)    - hold BB while on IV diuretics    - start spironolactone    - start dig 0.125    - cont lisinopril      2. ETOH    - defer to primary team    Thank for you allowing me to take part in your patient's care, please call should you have any questions or would like to discuss this patient.

## 2017-10-27 NOTE — PROGRESS NOTES
Pt c/o of being extremely anxious, hot, and sweaty. States he has not had any drinks today and normally drinks vodka throughout the entire day. Pt remains tachycardic and hypertensive. MD notified with orders received.

## 2017-10-27 NOTE — PROGRESS NOTES
Repeat CIWA score is 9. Chest pain still present but now rates 3/10. MD notified/updated, no orders received. Requested pt to possibly transfer to inpatient-MD states hospitalist in day shift can follow up.

## 2017-10-27 NOTE — H&P
"CHIEF COMPLAINT:  Shortness of breath and chest pain.    HISTORY OF PRESENT ILLNESS:  This is a 47-year-old gentleman who reports the   above stated symptoms.  He has been having shortness of breath, he thinks for   about a week or so, though he is uncertain.  He reports the symptoms are worse   when he exerts himself, especially when he walks, but he also has some night   when he lays flat and sometimes gets woken up with shortness of breath.  He   does sleep with few pillows and he reports this helps.  He has had a little   bit of cough.  He reports this is really very minor.  He has a history of   asthma and he does use an inhaler.  He notes this helps for about an hour, but   it does not seem to last any longer than that.  In general, he has also been   feeling very fatigued over the last week.  He had one episode of nausea and   vomiting this morning, however, he has not had any other episodes.  He does   report some chest pain which began today.  He describes it as sharp and   stabbing sensation in the left side of his chest.  It last a few seconds and   then resolves rapidly.  There has been no radiating to the back, neck, jaw, or   shoulder.  There has been no diaphoresis or palpitations other than when the   patient uses his albuterol.    REVIEW OF SYSTEMS:  Positive as noted above, otherwise all systems are   reviewed and negative.    PREVIOUS MEDICAL HISTORY:  1.  CHF with LVEF of 30%, 1 year ago.  2.  History of asthma.  3.  Pancreatitis.  4.  History of oral tobacco use.  5.  History of alcohol abuse.    MEDICATIONS:  Currently none.    ALLERGIES:  No known drug allergies.    SOCIAL HISTORY:  The patient is employed on doing maintenance rather physical   job.  He does not smoke; however, he does chew tobacco.  He reports that he   drinks.  He admits to \"a few shots of vodka every day.\" and he states he does   not drink; however.  He has tried to cut down and states he has cut down some   in the last few " months.  He denies any other drug use.    PAST SURGICAL HISTORY:  1.  Gallbladder.  2.  Ankle and back surgeries.    FAMILY HISTORY:  Reviewed but not relevant to presentation.    PHYSICAL EXAMINATION:  VITAL SIGNS:  Temperature 37.6, heart rate 116, respiratory rate 18, BP   147/96, satting in the upper 90s on room air.  GENERAL:  The patient is awake and alert.  He appears somewhat anxious, but in   no distress.  HEENT:  Head is normocephalic and atraumatic.  Mucous membranes are moist.    Sclerae and conjunctivae are benign.  NECK:  Trachea is in the midline.  There is no JVD or bruits.  Neck is supple.  RESPIRATORY:  Clear to auscultation bilaterally with the exception of some   faint crackles in bilateral bases.  CARDIAC:  Relative tachycardia with a rate in the 90s, sinus on the monitor   during my exam.  There is no murmur, rubs, or clicks.  ABDOMEN:  Soft.  No guarding, rebound, hepatosplenomegaly, or masses.    Nontender to palpation.  EXTREMITIES:  No clubbing, cyanosis, or edema.  Peripheral pulses are   maintained and symmetric.  Calves are without tenderness or palpable cords.  NEUROLOGIC:  Alert, nonfocal.  PSYCHIATRIC:  Anxious, but not inappropriate.    LABORATORY DATA:  White count 16.2, hemoglobin 12.9, platelet count 183.    Sodium 137, potassium 3.7, BUN 18, creatinine 0.78, glucose is 143.  ,   , alkaline phosphatase 259, total bilirubin 2.4.  Troponin I 0.04.  BNP   281.  EKG is reviewed and compared to study from approximately 1 year ago,   10/19/2016.  Today's study demonstrates a sinus tachycardia with some T-wave   inversions in V5 and V6 and less so in 1 and aVL.  There is some J-point   elevation in the anterior septal leads.  The T-wave inversions are not noted   on the comparison study; however, the J-point elevation is present on the   comparison study.  QTC is 492.  Impression; worrisome.    IMAGING STUDIES:  Portable chest x-rays reviewed.  No acute abnormalities are    appreciated.  Borderline cardiomegaly.    ASSESSMENT AND PLAN:  This is a 47-year-old gentleman with problems as   follows:  1.  Chest pain:  Patient's risk factors _____ young age of 47.  He has a   significant history of CHF, tobacco and alcohol use.  He does have some EKG   changes.  We will repeat a stat troponin at this point in time.  Assuming this   is normal, we will admit to a monitored bed, follow troponins and check a   stress imaging study in the a.m.  He has been ruled out with CTA tonight for   PE.  Additionally, the aorta was normal in morphology on the CTA though it was   not a dedicated aortic exam.  We will continue the patient on aspirin and   check a lipid panel as well.  2.  History of CHF:  The patient presents with what by review of systems at   least what appeared to be a flare of his CHF.  He had an LVEF of 30%, 1 year   ago.  We will be repeating an echo.  We will hold on any diuretics for the   moment pending more information tomorrow.  He clearly needs to be on therapy   for this including beta blockade and ACE inhibitor and eventually Aldactone.    Given his pressures at the moment, we will start him on a low dose of an ACE   inhibitor, I would hold off on the beta blockade in the setting of possible   acute flare of CHF.  We will have a very low threshold for consulting   cardiology.  3.  History of asthma:  Exam at this point in time is relatively benign.    Place him on O2 and respiratory protocols.  4.  LFT elevation:  The patient had a biliary ultrasound today, which is   relatively benign.  His LFTs; however, have gone up from October of last year   when he was in the double digits.  The study tonight demonstrated fatty   infiltration; however, the patient is also effectively an alcoholic.  We will   check hepatitis panel.  I have counseled him tonight on cessation of alcohol   abuse.  5.  History of pancreatitis:  Check lipase.  No evidence of biliary duct   dilation on  ultrasound today.  6.  Hyperglycemia:  The patient does not carry a history of type 2 diabetes.    Tonight's labs are not fasting, so we will repeat a BMP in the a.m. and check   an A1c to assess for possible new diagnosis of type 2 diabetes versus   stress-induced hyperglycemia.  7.  Oral tobacco use:  The patient does need to quit.  I have counseled him   tonight.  8.  Alcohol abuse:  We will cover him with p.r.n. Ativan tonight.  If he is   going to be in the hospital for any length of time, we will probably need to   start him on CIWA protocol.  We will also give a thiamine, folate, and   multivitamin supplements.    Treatment plan has been reviewed at length with the patient.  It is hopeful   that we can assess all these issues and treat him medically in the next 48   hours.  If we are unable to, then we would convert him to full admission.       ____________________________________     DO LUIS Faye / NABIL    DD:  10/26/2017 18:25:01  DT:  10/26/2017 20:41:18    D#:  [id number]  Job#:  [id number]

## 2017-10-28 ENCOUNTER — PATIENT OUTREACH (OUTPATIENT)
Dept: HEALTH INFORMATION MANAGEMENT | Facility: OTHER | Age: 47
End: 2017-10-28

## 2017-10-28 PROBLEM — B18.2 CHRONIC HEPATITIS C WITHOUT HEPATIC COMA (HCC): Status: ACTIVE | Noted: 2017-10-28

## 2017-10-28 LAB
ANION GAP SERPL CALC-SCNC: 8 MMOL/L (ref 0–11.9)
BUN SERPL-MCNC: 14 MG/DL (ref 8–22)
CALCIUM SERPL-MCNC: 8.3 MG/DL (ref 8.5–10.5)
CHLORIDE SERPL-SCNC: 101 MMOL/L (ref 96–112)
CO2 SERPL-SCNC: 25 MMOL/L (ref 20–33)
CREAT SERPL-MCNC: 0.87 MG/DL (ref 0.5–1.4)
GFR SERPL CREATININE-BSD FRML MDRD: >60 ML/MIN/1.73 M 2
GLUCOSE SERPL-MCNC: 198 MG/DL (ref 65–99)
HIV 1+2 AB+HIV1 P24 AG SERPL QL IA: NON REACTIVE
MAGNESIUM SERPL-MCNC: 2 MG/DL (ref 1.5–2.5)
POTASSIUM SERPL-SCNC: 4 MMOL/L (ref 3.6–5.5)
SODIUM SERPL-SCNC: 134 MMOL/L (ref 135–145)

## 2017-10-28 PROCEDURE — 700102 HCHG RX REV CODE 250 W/ 637 OVERRIDE(OP): Performed by: HOSPITALIST

## 2017-10-28 PROCEDURE — 700102 HCHG RX REV CODE 250 W/ 637 OVERRIDE(OP): Performed by: FAMILY MEDICINE

## 2017-10-28 PROCEDURE — 700111 HCHG RX REV CODE 636 W/ 250 OVERRIDE (IP): Performed by: HOSPITALIST

## 2017-10-28 PROCEDURE — 700102 HCHG RX REV CODE 250 W/ 637 OVERRIDE(OP): Performed by: INTERNAL MEDICINE

## 2017-10-28 PROCEDURE — 700111 HCHG RX REV CODE 636 W/ 250 OVERRIDE (IP): Performed by: INTERNAL MEDICINE

## 2017-10-28 PROCEDURE — 36415 COLL VENOUS BLD VENIPUNCTURE: CPT

## 2017-10-28 PROCEDURE — 87389 HIV-1 AG W/HIV-1&-2 AB AG IA: CPT

## 2017-10-28 PROCEDURE — A9270 NON-COVERED ITEM OR SERVICE: HCPCS | Performed by: HOSPITALIST

## 2017-10-28 PROCEDURE — 87522 HEPATITIS C REVRS TRNSCRPJ: CPT

## 2017-10-28 PROCEDURE — 83735 ASSAY OF MAGNESIUM: CPT

## 2017-10-28 PROCEDURE — A9270 NON-COVERED ITEM OR SERVICE: HCPCS | Performed by: INTERNAL MEDICINE

## 2017-10-28 PROCEDURE — A9270 NON-COVERED ITEM OR SERVICE: HCPCS | Performed by: FAMILY MEDICINE

## 2017-10-28 PROCEDURE — 99233 SBSQ HOSP IP/OBS HIGH 50: CPT | Performed by: INTERNAL MEDICINE

## 2017-10-28 PROCEDURE — 80048 BASIC METABOLIC PNL TOTAL CA: CPT

## 2017-10-28 PROCEDURE — 770020 HCHG ROOM/CARE - TELE (206)

## 2017-10-28 RX ORDER — METOPROLOL SUCCINATE 25 MG/1
25 TABLET, EXTENDED RELEASE ORAL
Status: DISCONTINUED | OUTPATIENT
Start: 2017-10-28 | End: 2017-10-29 | Stop reason: HOSPADM

## 2017-10-28 RX ORDER — FUROSEMIDE 10 MG/ML
40 INJECTION INTRAMUSCULAR; INTRAVENOUS
Status: DISCONTINUED | OUTPATIENT
Start: 2017-10-28 | End: 2017-10-29

## 2017-10-28 RX ADMIN — THERA TABS 1 TABLET: TAB at 08:02

## 2017-10-28 RX ADMIN — Medication 50 MG: at 08:01

## 2017-10-28 RX ADMIN — DIGOXIN 125 MCG: 125 TABLET ORAL at 18:20

## 2017-10-28 RX ADMIN — LIDOCAINE HYDROCHLORIDE 30 ML: 20 SOLUTION OROPHARYNGEAL at 08:02

## 2017-10-28 RX ADMIN — FAMOTIDINE 20 MG: 10 INJECTION, SOLUTION INTRAVENOUS at 20:40

## 2017-10-28 RX ADMIN — OXYCODONE HYDROCHLORIDE 10 MG: 10 TABLET ORAL at 20:40

## 2017-10-28 RX ADMIN — FAMOTIDINE 20 MG: 10 INJECTION, SOLUTION INTRAVENOUS at 10:40

## 2017-10-28 RX ADMIN — OXYCODONE HYDROCHLORIDE 10 MG: 10 TABLET ORAL at 08:02

## 2017-10-28 RX ADMIN — MORPHINE SULFATE 4 MG: 4 INJECTION INTRAVENOUS at 01:51

## 2017-10-28 RX ADMIN — MORPHINE SULFATE 4 MG: 4 INJECTION INTRAVENOUS at 05:03

## 2017-10-28 RX ADMIN — ENOXAPARIN SODIUM 40 MG: 100 INJECTION SUBCUTANEOUS at 08:02

## 2017-10-28 RX ADMIN — MORPHINE SULFATE 4 MG: 4 INJECTION INTRAVENOUS at 18:20

## 2017-10-28 RX ADMIN — ASPIRIN 325 MG: 325 TABLET, FILM COATED ORAL at 08:02

## 2017-10-28 RX ADMIN — MORPHINE SULFATE 4 MG: 4 INJECTION INTRAVENOUS at 15:22

## 2017-10-28 RX ADMIN — SPIRONOLACTONE 25 MG: 25 TABLET, FILM COATED ORAL at 08:02

## 2017-10-28 RX ADMIN — MORPHINE SULFATE 4 MG: 4 INJECTION INTRAVENOUS at 11:22

## 2017-10-28 RX ADMIN — ALBUTEROL SULFATE 2 PUFF: 90 AEROSOL, METERED RESPIRATORY (INHALATION) at 01:53

## 2017-10-28 RX ADMIN — LORAZEPAM 1 MG: 1 TABLET ORAL at 08:01

## 2017-10-28 RX ADMIN — FUROSEMIDE 40 MG: 10 INJECTION, SOLUTION INTRAMUSCULAR; INTRAVENOUS at 10:40

## 2017-10-28 RX ADMIN — FOLIC ACID 1 MG: 1 TABLET ORAL at 08:02

## 2017-10-28 RX ADMIN — LISINOPRIL 10 MG: 10 TABLET ORAL at 08:02

## 2017-10-28 RX ADMIN — TEMAZEPAM 15 MG: 15 CAPSULE ORAL at 01:51

## 2017-10-28 RX ADMIN — STANDARDIZED SENNA CONCENTRATE AND DOCUSATE SODIUM 2 TABLET: 8.6; 5 TABLET, FILM COATED ORAL at 20:40

## 2017-10-28 ASSESSMENT — LIFESTYLE VARIABLES
PAROXYSMAL SWEATS: BARELY PERCEPTIBLE SWEATING. PALMS MOIST
AUDITORY DISTURBANCES: NOT PRESENT
ORIENTATION AND CLOUDING OF SENSORIUM: ORIENTED AND CAN DO SERIAL ADDITIONS
NAUSEA AND VOMITING: NO NAUSEA AND NO VOMITING
TREMOR: TREMOR NOT VISIBLE BUT CAN BE FELT, FINGERTIP TO FINGERTIP
ANXIETY: NO ANXIETY (AT EASE)
TOTAL SCORE: 8
PAROXYSMAL SWEATS: BARELY PERCEPTIBLE SWEATING. PALMS MOIST
PAROXYSMAL SWEATS: NO SWEAT VISIBLE
ORIENTATION AND CLOUDING OF SENSORIUM: ORIENTED AND CAN DO SERIAL ADDITIONS
ORIENTATION AND CLOUDING OF SENSORIUM: ORIENTED AND CAN DO SERIAL ADDITIONS
AGITATION: NORMAL ACTIVITY
VISUAL DISTURBANCES: NOT PRESENT
AUDITORY DISTURBANCES: NOT PRESENT
TOTAL SCORE: 2
AGITATION: NORMAL ACTIVITY
ANXIETY: *
ORIENTATION AND CLOUDING OF SENSORIUM: ORIENTED AND CAN DO SERIAL ADDITIONS
HEADACHE, FULLNESS IN HEAD: NOT PRESENT
NAUSEA AND VOMITING: NO NAUSEA AND NO VOMITING
TOTAL SCORE: 2
PAROXYSMAL SWEATS: BARELY PERCEPTIBLE SWEATING. PALMS MOIST
HEADACHE, FULLNESS IN HEAD: NOT PRESENT
AGITATION: SOMEWHAT MORE THAN NORMAL ACTIVITY
ANXIETY: NO ANXIETY (AT EASE)
TREMOR: TREMOR NOT VISIBLE BUT CAN BE FELT, FINGERTIP TO FINGERTIP
VISUAL DISTURBANCES: NOT PRESENT
HEADACHE, FULLNESS IN HEAD: NOT PRESENT
HEADACHE, FULLNESS IN HEAD: NOT PRESENT
PAROXYSMAL SWEATS: BARELY PERCEPTIBLE SWEATING. PALMS MOIST
ORIENTATION AND CLOUDING OF SENSORIUM: ORIENTED AND CAN DO SERIAL ADDITIONS
TREMOR: TREMOR NOT VISIBLE BUT CAN BE FELT, FINGERTIP TO FINGERTIP
TREMOR: NO TREMOR
AUDITORY DISTURBANCES: NOT PRESENT
VISUAL DISTURBANCES: NOT PRESENT
AUDITORY DISTURBANCES: NOT PRESENT
ANXIETY: MILDLY ANXIOUS
TOTAL SCORE: 2
VISUAL DISTURBANCES: NOT PRESENT
TOTAL SCORE: 1
VISUAL DISTURBANCES: NOT PRESENT
AGITATION: NORMAL ACTIVITY
ORIENTATION AND CLOUDING OF SENSORIUM: ORIENTED AND CAN DO SERIAL ADDITIONS
TREMOR: *
ANXIETY: NO ANXIETY (AT EASE)
NAUSEA AND VOMITING: NO NAUSEA AND NO VOMITING
NAUSEA AND VOMITING: NO NAUSEA AND NO VOMITING
HEADACHE, FULLNESS IN HEAD: VERY MILD
AUDITORY DISTURBANCES: NOT PRESENT
ANXIETY: NO ANXIETY (AT EASE)
PAROXYSMAL SWEATS: BARELY PERCEPTIBLE SWEATING. PALMS MOIST
AGITATION: NORMAL ACTIVITY
AUDITORY DISTURBANCES: NOT PRESENT
HEADACHE, FULLNESS IN HEAD: MILD
AGITATION: NORMAL ACTIVITY
TREMOR: TREMOR NOT VISIBLE BUT CAN BE FELT, FINGERTIP TO FINGERTIP
NAUSEA AND VOMITING: NO NAUSEA AND NO VOMITING
VISUAL DISTURBANCES: NOT PRESENT
NAUSEA AND VOMITING: NO NAUSEA AND NO VOMITING
TOTAL SCORE: 3

## 2017-10-28 ASSESSMENT — ENCOUNTER SYMPTOMS
WEAKNESS: 1
CHILLS: 0
LOSS OF CONSCIOUSNESS: 0
DIARRHEA: 0
BACK PAIN: 0
ABDOMINAL PAIN: 0
DIZZINESS: 0
NERVOUS/ANXIOUS: 1
SHORTNESS OF BREATH: 1
COUGH: 0
FEVER: 0
HEADACHES: 0

## 2017-10-28 ASSESSMENT — PAIN SCALES - GENERAL
PAINLEVEL_OUTOF10: 8
PAINLEVEL_OUTOF10: 2
PAINLEVEL_OUTOF10: 7
PAINLEVEL_OUTOF10: 7
PAINLEVEL_OUTOF10: 8
PAINLEVEL_OUTOF10: 2
PAINLEVEL_OUTOF10: 7
PAINLEVEL_OUTOF10: 6
PAINLEVEL_OUTOF10: 3
PAINLEVEL_OUTOF10: 6

## 2017-10-28 NOTE — CARE PLAN
Problem: Knowledge Deficit  Goal: Knowledge of disease process/condition, treatment plan, diagnostic tests, and medications will improve  Pt educated on low salt diet and modified salt intake, educated on medication, when to call MD and weight self daily

## 2017-10-28 NOTE — PROGRESS NOTES
Bedside report with full participation of the pt received from day RN and care assumed.  Pt is resting in bed with no complaints in no acute distress.  Pt is able to verbalize needs and verbalizes understanding of procedures including but not limited to appropriate call light use.  Pt denies pain at this time.  Bed is in the lowest position and locked, side rails are up x 2, non- skid socks are on and the call light and personal belongings are within reach.  Pt verbalizes that needs are currently met.  Hourly rounding instituted.  Will continue to monitor.

## 2017-10-28 NOTE — PROGRESS NOTES
"Interval History:  47-year-old male with nonischemic systolic congestive heart failure secondary to alcohol abuse and hypertensive heart disease. Continues drinking and did not follow-up after his last hospitalization when he was diagnosed. Presents with congestive heart failure symptoms and his ejection fraction is further reduced secondary to his noncompliance with medical therapy.  10/28: Tolerating restart of his medical therapy well. No signs of active withdrawal.    Physical Exam   Blood pressure 111/76, pulse 68, temperature 36.6 °C (97.8 °F), resp. rate 20, height 1.778 m (5' 10\"), weight 72.6 kg (160 lb 0.9 oz), SpO2 90 %.    Constitutional: Appears well-developed.   HENT: Normocephalic and atraumatic. No scleral icterus.   Neck: JVD present.   Cardiovascular: Normal rate. Exam reveals no gallop and no friction rub. No murmur heard.   Pulmonary/Chest: CTAB    Abdominal: S/NT/ND BS+   Musculoskeletal: Exhibits edema. Pulses present.  Skin: Skin is warm and dry.     ROS: As HPI other reviewed and negative       Intake/Output Summary (Last 24 hours) at 10/28/17 0836  Last data filed at 10/27/17 2100   Gross per 24 hour   Intake              836 ml   Output              900 ml   Net              -64 ml       Recent Labs      10/26/17   1507   WBC  6.2   RBC  3.78*   HEMOGLOBIN  12.9*   HEMATOCRIT  36.6*   MCV  96.8   MCH  34.1*   MCHC  35.2   RDW  40.8   PLATELETCT  183   MPV  9.4     Recent Labs      10/26/17   1507  10/27/17   0331  10/28/17   0139   SODIUM  137  134*  134*   POTASSIUM  3.7  3.9  4.0   CHLORIDE  104  101  101   CO2  18*  22  25   GLUCOSE  143*  161*  198*   BUN  18  16  14   CREATININE  0.78  0.71  0.87   CALCIUM  9.1  8.9  8.3*         Recent Labs      10/26/17   1507   BNPBTYPENAT  281*     Recent Labs      10/26/17   1507  10/26/17   1833  10/26/17   2245  10/27/17   0331   TROPONINI  0.04  0.05*  0.05*  0.04   BNPBTYPENAT  281*   --    --    --      Recent Labs      10/27/17   0331 "   TRIGLYCERIDE  55   HDL  110   LDL  89       No current facility-administered medications on file prior to encounter.      No current outpatient prescriptions on file prior to encounter.       Current Facility-Administered Medications   Medication Dose Frequency Provider Last Rate Last Dose   • lisinopril (PRINIVIL) 10 MG tablet 10 mg  10 mg Q DAY LASHA Bazan.O.   10 mg at 10/28/17 0802   • spironolactone (ALDACTONE) tablet 25 mg  25 mg Q DAY Xu Burns M.D.   25 mg at 10/28/17 0802   • digoxin (LANOXIN) tablet 125 mcg  125 mcg DAILY AT 1800 Xu uBrns M.D.   125 mcg at 10/27/17 1710   • enoxaparin (LOVENOX) inj 40 mg  40 mg DAILY LASHA Bazan.O.   40 mg at 10/28/17 0802   • aspirin (ASA) tablet 325 mg  325 mg DAILY LASHA Bazan.O.   325 mg at 10/28/17 0802    Or   • aspirin (ASA) chewable tab 324 mg  324 mg DAILY James Quinn D.O.        Or   • aspirin (ASA) suppository 300 mg  300 mg DAILY James Quinn D.O.       • senna-docusate (PERICOLACE or SENOKOT S) 8.6-50 MG per tablet 2 Tab  2 Tab BID James Quinn D.O.        And   • polyethylene glycol/lytes (MIRALAX) PACKET 1 Packet  1 Packet QDAY PRN James Quinn D.O.        And   • magnesium hydroxide (MILK OF MAGNESIA) suspension 30 mL  30 mL QDAY PRN James Quinn D.O.        And   • bisacodyl (DULCOLAX) suppository 10 mg  10 mg QDAY PRN James Quinn D.O.       • Respiratory Care per Protocol   Continuous RT James Quinn D.O.       • acetaminophen (TYLENOL) tablet 650 mg  650 mg Q6HRS PRN HO BazanOEloise   650 mg at 10/27/17 0159   • Pharmacy Consult Request ...Pain Management Review   PRN James Bunuel-Chio, D.O.        And   • oxycodone immediate-release (ROXICODONE) tablet 5 mg  5 mg Q3HRS PRN James Quinn D.O.        And   • oxycodone immediate release (ROXICODONE) tablet 10 mg  10 mg Q3HRS PRN  James Quinn D.O.   10 mg at 10/28/17 0802    And   • morphine (pf) 4 mg/ml injection 4 mg  4 mg Q3HRS PRN LASHA Bazna.O.   4 mg at 10/28/17 0503   • ondansetron (ZOFRAN) syringe/vial injection 4 mg  4 mg Q4HRS PRN LASHA Bazan.O.   4 mg at 10/26/17 2318   • ondansetron (ZOFRAN ODT) dispertab 4 mg  4 mg Q4HRS PRN James Quinn D.O.       • promethazine (PHENERGAN) tablet 12.5-25 mg  12.5-25 mg Q4HRS PRN James Quinn D.O.       • promethazine (PHENERGAN) suppository 12.5-25 mg  12.5-25 mg Q4HRS PRN LASHA Bazan.O.       • prochlorperazine (COMPAZINE) injection 5-10 mg  5-10 mg Q4HRS PRN James Quinn D.O.       • temazepam (RESTORIL) capsule 15 mg  15 mg HS PRN - MR X 1 James Quinn D.O.   15 mg at 10/28/17 0151   • nicotine (NICODERM) 14 MG/24HR 14 mg  14 mg Daily-0600 LASHA Bazan.O.   Stopped at 10/27/17 0600    And   • nicotine polacrilex (NICORETTE) 2 MG piece 2 mg  2 mg Q HOUR PRN LASHA Bazan.O.   2 mg at 10/27/17 0159   • thiamine (THIAMINE) tablet 50 mg  50 mg DAILY James Quinn D.O.   50 mg at 10/28/17 0801   • folic acid (FOLVITE) tablet 1 mg  1 mg DAILY James Quinn D.O.   1 mg at 10/28/17 0802   • multivitamin (THERAGRAN) tablet 1 Tab  1 Tab DAILY LASHA Bazan.O.   1 Tab at 10/28/17 0802   • albuterol inhaler 2 Puff  2 Puff Q4HRS PRN Trevin Sheikh M.D.   2 Puff at 10/28/17 0153   • lorazepam (ATIVAN) tablet 0.5 mg  0.5 mg Q4HRS PRDARRYL Bill M.D.       • lorazepam (ATIVAN) tablet 1 mg  1 mg Q4HRS PRDARRYL Bill M.D.   1 mg at 10/28/17 0801    Or   • lorazepam (ATIVAN) injection 0.5 mg  0.5 mg Q4HRS PRDARRYL Bill M.D.       • lorazepam (ATIVAN) tablet 2 mg  2 mg Q2HRS PRDARRYL Bill M.D.        Or   • lorazepam (ATIVAN) injection 1 mg  1 mg Q2HRS PRDARRYL Bill M.D.       • lorazepam (ATIVAN)  tablet 3 mg  3 mg Q HOUR PRN Hadley Bill M.D.        Or   • lorazepam (ATIVAN) injection 1.5 mg  1.5 mg Q HOUR PRN Hadley Bill M.D.       • lorazepam (ATIVAN) tablet 4 mg  4 mg Q15 MIN PRN Hadley Bill M.D.        Or   • lorazepam (ATIVAN) injection 2 mg  2 mg Q15 MIN PRN Hadley Bill M.D.       • nitroglycerin (NITROSTAT) tablet 0.4 mg  0.4 mg Q5 MIN PRN Hadley Bill M.D.   0.4 mg at 10/26/17 2311     Last reviewed on 10/26/2017  8:01 PM by Sylwia Murphy R.N.  Medications reviewed    Imaging reviewed    ECHO(10/27/2017):  Compared to the images of the study done 10/19/16 - there has been   slight further reduction in the ejection fraction from 30% to 20%.   Left ventricle is mildly dilated.  Left ventricular ejection fraction is visually estimated to be 20%.  Grade III diastolic dysfunction (restrictive pattern).  Moderately dilated right ventricle.  Reduced right ventricular systolic function.  Mild mitral regurgitation.  Right ventricular systolic pressure is estimated to be 25 mmHg    Impressions:  1. Nonischemic cardiomyopathy  2. Alcoholic cardiomyopathy  3. Decompensated systolic heart failure, LVEF 20%  4. Reduced right ventricular systolic function  5. Alcohol abuse  6. Medication noncompliance  7. Hypertension    Recommendations:  Continue aspirin, lisinopril, spironolactone.  He has not been initiated on intravenous diuresis which explains his balance ins and outs. I do recommend forced diuresis as he is clearly volume overloaded. I have taken the liberty of ordering this.  Once he has adequate diuresis and recommend initiation of carvedilol dosing dependent on his blood pressure.  Recommend abstinence from alcohol and improved medical compliance

## 2017-10-28 NOTE — CARE PLAN
Problem: Acute Care of the Heart Failure Patient  Goal: Optimal Outcome for the HF Patient  Pt educated on heart failure, his EF of 15%, daily weights, low salt diet, abstaining from ETOH and MD follow up

## 2017-10-28 NOTE — PROGRESS NOTES
Bedside report received from night RN.  Pt assessed A&Ox4, no c/o pain, no sob noted.  POC discussed with pt, all questions answered.  Pt states all needs are met.  Bed alarm not on, pt self ambulatory and safe, bed in lowest position, call light within reach.  Will continue to monitor

## 2017-10-28 NOTE — PROGRESS NOTES
[facility] Hospitalist Progress Note    Date of Service: 10/28/2017    Chief Complaint  47 y.o. male admitted 10/26/2017 with SOB and CP.  Hx of CHF with LVEF of 30% one year ago but stopped his meds; no clear reason why when querried    Interval Problem Update  CMY-likely related to ETOH, still feels quite SOB and weak, but a little better compared to when he first came in. Explained rationale behind treatment.    HCV-new diagnosis, explained, sent off further lab studies    ETOHism-CIWA scores remain pretty low right, minimal use of ativan at this time.    Consultants/Specialty  cards    Disposition  Maintain on tele for now        Review of Systems   Constitutional: Negative for chills and fever.   Respiratory: Positive for shortness of breath. Negative for cough.    Cardiovascular: Positive for chest pain (very mild at this time).   Gastrointestinal: Negative for abdominal pain and diarrhea.   Musculoskeletal: Negative for back pain.   Skin: Negative for rash.   Neurological: Positive for weakness (a little better today). Negative for dizziness, loss of consciousness and headaches.   Psychiatric/Behavioral: The patient is nervous/anxious.    All other systems reviewed and are negative.     Physical Exam  Laboratory/Imaging   Hemodynamics  Temp (24hrs), Av.3 °C (97.4 °F), Min:35.9 °C (96.7 °F), Max:36.6 °C (97.8 °F)   Temperature: 36.3 °C (97.4 °F)  Pulse  Av.5  Min: 68  Max: 126   Blood Pressure: 126/77      Respiratory      Respiration: 19, Pulse Oximetry: 95 %     Work Of Breathing / Effort: Mild  RUL Breath Sounds: Clear, RML Breath Sounds: Clear, RLL Breath Sounds: Clear, PENNY Breath Sounds: Clear, LLL Breath Sounds: Clear    Fluids    Intake/Output Summary (Last 24 hours) at 10/28/17 1207  Last data filed at 10/27/17 2100   Gross per 24 hour   Intake              836 ml   Output              900 ml   Net              -64 ml       Nutrition  Orders Placed This Encounter   Procedures   • Protocol 1312  Diet Order: Reg, No Decaf, No Caffeine - Cardiac Stress Test Treadmill with Isotope     Standing Status:   Standing     Number of Occurrences:   1     Order Specific Question:   Diet:     Answer:   Regular [1]     Order Specific Question:   Miscellaneous modifications:     Answer:   No Decaf, No Caffeine(for test) [11]     Comments:   Protocol 1312 No caffeine for 12 hours prior to exam (decaf, coffee, cola, tea, chocolate)     Physical Exam   Constitutional: He is oriented to person, place, and time. He appears well-developed and well-nourished. No distress.   HENT:   Head: Normocephalic and atraumatic.   Eyes: Conjunctivae are normal. Right eye exhibits no discharge. Left eye exhibits no discharge.   Neck: JVD present.   Cardiovascular: Normal rate.  Exam reveals gallop.    Murmur heard.  Pulmonary/Chest: Effort normal. No stridor. No respiratory distress. He has rales (at the bases).   Abdominal: Soft. He exhibits no distension. There is no tenderness.   Musculoskeletal: He exhibits no edema or tenderness.   Warm peripherally   Neurological: He is oriented to person, place, and time. No cranial nerve deficit.   Skin: Skin is warm and dry. He is not diaphoretic. No erythema.   Psychiatric: He has a normal mood and affect. Thought content normal.   Nursing note and vitals reviewed.      Recent Labs      10/26/17   1507   WBC  6.2   RBC  3.78*   HEMOGLOBIN  12.9*   HEMATOCRIT  36.6*   MCV  96.8   MCH  34.1*   MCHC  35.2   RDW  40.8   PLATELETCT  183   MPV  9.4     Recent Labs      10/26/17   1507  10/27/17   0331  10/28/17   0139   SODIUM  137  134*  134*   POTASSIUM  3.7  3.9  4.0   CHLORIDE  104  101  101   CO2  18*  22  25   GLUCOSE  143*  161*  198*   BUN  18  16  14   CREATININE  0.78  0.71  0.87   CALCIUM  9.1  8.9  8.3*         Recent Labs      10/26/17   1507   BNPBTYPENAT  281*     Recent Labs      10/27/17   0331   TRIGLYCERIDE  55   HDL  110   LDL  89          Assessment/Plan     Uncontrolled  hypertension- (present on admission)   Assessment & Plan    -BP;s are doing better today, titrate PRN as outlined above, BB and ACE (-) for now        Chronic hepatitis C without hepatic coma (CMS-HCC)- (present on admission)   Assessment & Plan    -send off VL RNA PCR and genotyping, stressed the importance of abstaining from ETOH consumption        Acute systolic congestive heart failure (CMS-HCC)- (present on admission)   Assessment & Plan    -see below        ETOH abuse   Assessment & Plan    Thiamine  Folate  MVI  CIWA protocol-low numbers right now, monitor closely  Encourage cesation        Chest pain- (present on admission)   Assessment & Plan    Work up as noted        Cardiomyopathy (CMS-HCC)- (present on admission)   Assessment & Plan    Likely ETOH induced, recent negative cad w/u  -add IV lasix 40 mg daily  -continue aldactone, toprol XL 25 mg daiyl, lisinopril 10 mg daily            Reviewed items::  Labs reviewed and Medications reviewed  Willett catheter::  No Willett  DVT prophylaxis pharmacological::  Enoxaparin (Lovenox)

## 2017-10-29 VITALS
SYSTOLIC BLOOD PRESSURE: 115 MMHG | RESPIRATION RATE: 18 BRPM | OXYGEN SATURATION: 94 % | TEMPERATURE: 98.1 F | DIASTOLIC BLOOD PRESSURE: 78 MMHG | HEIGHT: 70 IN | BODY MASS INDEX: 24.55 KG/M2 | WEIGHT: 171.52 LBS | HEART RATE: 85 BPM

## 2017-10-29 PROBLEM — R07.9 CHEST PAIN: Status: RESOLVED | Noted: 2017-10-26 | Resolved: 2017-10-29

## 2017-10-29 PROBLEM — I50.21 ACUTE SYSTOLIC CONGESTIVE HEART FAILURE (HCC): Status: RESOLVED | Noted: 2017-10-27 | Resolved: 2017-10-29

## 2017-10-29 LAB
ANION GAP SERPL CALC-SCNC: 11 MMOL/L (ref 0–11.9)
BUN SERPL-MCNC: 14 MG/DL (ref 8–22)
CALCIUM SERPL-MCNC: 8.6 MG/DL (ref 8.5–10.5)
CHLORIDE SERPL-SCNC: 99 MMOL/L (ref 96–112)
CO2 SERPL-SCNC: 25 MMOL/L (ref 20–33)
CREAT SERPL-MCNC: 0.88 MG/DL (ref 0.5–1.4)
EKG IMPRESSION: NORMAL
GFR SERPL CREATININE-BSD FRML MDRD: >60 ML/MIN/1.73 M 2
GLUCOSE SERPL-MCNC: 114 MG/DL (ref 65–99)
HCV RNA SERPL NAA+PROBE-ACNC: ABNORMAL IU/ML
HCV RNA SERPL NAA+PROBE-LOG IU: 6.7 LOG IU
HCV RNA SERPL QL NAA+PROBE: DETECTED
MAGNESIUM SERPL-MCNC: 1.9 MG/DL (ref 1.5–2.5)
PATHOLOGY STUDY: ABNORMAL
POTASSIUM SERPL-SCNC: 4.6 MMOL/L (ref 3.6–5.5)
SODIUM SERPL-SCNC: 135 MMOL/L (ref 135–145)
TROPONIN I SERPL-MCNC: 0.12 NG/ML (ref 0–0.04)

## 2017-10-29 PROCEDURE — 700111 HCHG RX REV CODE 636 W/ 250 OVERRIDE (IP): Performed by: INTERNAL MEDICINE

## 2017-10-29 PROCEDURE — A9270 NON-COVERED ITEM OR SERVICE: HCPCS | Performed by: INTERNAL MEDICINE

## 2017-10-29 PROCEDURE — 80048 BASIC METABOLIC PNL TOTAL CA: CPT

## 2017-10-29 PROCEDURE — 700102 HCHG RX REV CODE 250 W/ 637 OVERRIDE(OP): Performed by: INTERNAL MEDICINE

## 2017-10-29 PROCEDURE — 83735 ASSAY OF MAGNESIUM: CPT

## 2017-10-29 PROCEDURE — 93005 ELECTROCARDIOGRAM TRACING: CPT | Performed by: INTERNAL MEDICINE

## 2017-10-29 PROCEDURE — 84484 ASSAY OF TROPONIN QUANT: CPT

## 2017-10-29 PROCEDURE — 700102 HCHG RX REV CODE 250 W/ 637 OVERRIDE(OP): Performed by: HOSPITALIST

## 2017-10-29 PROCEDURE — 99239 HOSP IP/OBS DSCHRG MGMT >30: CPT | Performed by: INTERNAL MEDICINE

## 2017-10-29 PROCEDURE — A9270 NON-COVERED ITEM OR SERVICE: HCPCS | Performed by: HOSPITALIST

## 2017-10-29 PROCEDURE — 700111 HCHG RX REV CODE 636 W/ 250 OVERRIDE (IP): Performed by: HOSPITALIST

## 2017-10-29 PROCEDURE — 93010 ELECTROCARDIOGRAM REPORT: CPT | Performed by: INTERNAL MEDICINE

## 2017-10-29 PROCEDURE — 36415 COLL VENOUS BLD VENIPUNCTURE: CPT

## 2017-10-29 RX ORDER — DIGOXIN 125 MCG
125 TABLET ORAL DAILY
Qty: 30 TAB | Refills: 1 | Status: SHIPPED | OUTPATIENT
Start: 2017-10-29 | End: 2017-11-01 | Stop reason: SDUPTHER

## 2017-10-29 RX ORDER — FAMOTIDINE 20 MG/1
20 TABLET, FILM COATED ORAL 2 TIMES DAILY
Status: DISCONTINUED | OUTPATIENT
Start: 2017-10-29 | End: 2017-10-29 | Stop reason: HOSPADM

## 2017-10-29 RX ORDER — FUROSEMIDE 40 MG/1
40 TABLET ORAL DAILY
Qty: 30 TAB | Refills: 1 | Status: SHIPPED | OUTPATIENT
Start: 2017-10-30 | End: 2017-11-01 | Stop reason: SDUPTHER

## 2017-10-29 RX ORDER — SPIRONOLACTONE 25 MG/1
12.5 TABLET ORAL DAILY
Qty: 30 TAB | Refills: 1 | Status: SHIPPED | OUTPATIENT
Start: 2017-10-30 | End: 2017-11-01 | Stop reason: SDUPTHER

## 2017-10-29 RX ORDER — SPIRONOLACTONE 25 MG/1
12.5 TABLET ORAL
Status: DISCONTINUED | OUTPATIENT
Start: 2017-10-30 | End: 2017-10-29 | Stop reason: HOSPADM

## 2017-10-29 RX ORDER — METOPROLOL SUCCINATE 25 MG/1
25 TABLET, EXTENDED RELEASE ORAL DAILY
Qty: 30 TAB | Refills: 1 | Status: SHIPPED | OUTPATIENT
Start: 2017-10-29 | End: 2017-11-01 | Stop reason: SDUPTHER

## 2017-10-29 RX ORDER — FAMOTIDINE 20 MG/1
20 TABLET, FILM COATED ORAL 2 TIMES DAILY
Qty: 60 TAB | Refills: 1 | Status: SHIPPED | OUTPATIENT
Start: 2017-10-29 | End: 2017-11-01 | Stop reason: SDUPTHER

## 2017-10-29 RX ORDER — FOLIC ACID 1 MG/1
1 TABLET ORAL DAILY
Qty: 30 TAB | Refills: 1 | Status: SHIPPED | OUTPATIENT
Start: 2017-10-29 | End: 2017-11-01 | Stop reason: SDUPTHER

## 2017-10-29 RX ORDER — FUROSEMIDE 40 MG/1
40 TABLET ORAL
Status: DISCONTINUED | OUTPATIENT
Start: 2017-10-30 | End: 2017-10-29 | Stop reason: HOSPADM

## 2017-10-29 RX ORDER — LISINOPRIL 10 MG/1
10 TABLET ORAL DAILY
Qty: 30 TAB | Refills: 1 | Status: SHIPPED | OUTPATIENT
Start: 2017-10-29 | End: 2017-11-01 | Stop reason: SDUPTHER

## 2017-10-29 RX ADMIN — THERA TABS 1 TABLET: TAB at 07:59

## 2017-10-29 RX ADMIN — FOLIC ACID 1 MG: 1 TABLET ORAL at 07:59

## 2017-10-29 RX ADMIN — FUROSEMIDE 40 MG: 10 INJECTION, SOLUTION INTRAMUSCULAR; INTRAVENOUS at 08:34

## 2017-10-29 RX ADMIN — FAMOTIDINE 20 MG: 10 INJECTION, SOLUTION INTRAVENOUS at 08:34

## 2017-10-29 RX ADMIN — MORPHINE SULFATE 4 MG: 4 INJECTION INTRAVENOUS at 00:08

## 2017-10-29 RX ADMIN — ASPIRIN 81 MG: 81 TABLET ORAL at 07:59

## 2017-10-29 RX ADMIN — LISINOPRIL 10 MG: 10 TABLET ORAL at 07:59

## 2017-10-29 RX ADMIN — MORPHINE SULFATE 4 MG: 4 INJECTION INTRAVENOUS at 04:02

## 2017-10-29 RX ADMIN — ONDANSETRON 4 MG: 4 TABLET, ORALLY DISINTEGRATING ORAL at 00:09

## 2017-10-29 RX ADMIN — Medication 50 MG: at 07:59

## 2017-10-29 RX ADMIN — ENOXAPARIN SODIUM 40 MG: 100 INJECTION SUBCUTANEOUS at 08:00

## 2017-10-29 RX ADMIN — MORPHINE SULFATE 4 MG: 4 INJECTION INTRAVENOUS at 11:06

## 2017-10-29 RX ADMIN — OXYCODONE HYDROCHLORIDE 10 MG: 10 TABLET ORAL at 08:00

## 2017-10-29 RX ADMIN — SPIRONOLACTONE 25 MG: 25 TABLET, FILM COATED ORAL at 08:00

## 2017-10-29 RX ADMIN — STANDARDIZED SENNA CONCENTRATE AND DOCUSATE SODIUM 2 TABLET: 8.6; 5 TABLET, FILM COATED ORAL at 08:00

## 2017-10-29 ASSESSMENT — LIFESTYLE VARIABLES
PAROXYSMAL SWEATS: NO SWEAT VISIBLE
TOTAL SCORE: 3
HEADACHE, FULLNESS IN HEAD: NOT PRESENT
HEADACHE, FULLNESS IN HEAD: VERY MILD
HEADACHE, FULLNESS IN HEAD: NOT PRESENT
NAUSEA AND VOMITING: NO NAUSEA AND NO VOMITING
TREMOR: TREMOR NOT VISIBLE BUT CAN BE FELT, FINGERTIP TO FINGERTIP
PAROXYSMAL SWEATS: NO SWEAT VISIBLE
VISUAL DISTURBANCES: NOT PRESENT
PAROXYSMAL SWEATS: NO SWEAT VISIBLE
ANXIETY: NO ANXIETY (AT EASE)
ORIENTATION AND CLOUDING OF SENSORIUM: ORIENTED AND CAN DO SERIAL ADDITIONS
TOTAL SCORE: 1
TREMOR: TREMOR NOT VISIBLE BUT CAN BE FELT, FINGERTIP TO FINGERTIP
NAUSEA AND VOMITING: NO NAUSEA AND NO VOMITING
AGITATION: NORMAL ACTIVITY
TREMOR: TREMOR NOT VISIBLE BUT CAN BE FELT, FINGERTIP TO FINGERTIP
AUDITORY DISTURBANCES: NOT PRESENT
NAUSEA AND VOMITING: NO NAUSEA AND NO VOMITING
TOTAL SCORE: 1
AUDITORY DISTURBANCES: NOT PRESENT
ANXIETY: NO ANXIETY (AT EASE)
ANXIETY: NO ANXIETY (AT EASE)
VISUAL DISTURBANCES: NOT PRESENT
VISUAL DISTURBANCES: NOT PRESENT
ORIENTATION AND CLOUDING OF SENSORIUM: ORIENTED AND CAN DO SERIAL ADDITIONS
AUDITORY DISTURBANCES: NOT PRESENT
ANXIETY: MILDLY ANXIOUS
AGITATION: NORMAL ACTIVITY
PAROXYSMAL SWEATS: NO SWEAT VISIBLE
NAUSEA AND VOMITING: MILD NAUSEA WITH NO VOMITING
AGITATION: NORMAL ACTIVITY
EVER_SMOKED: NEVER
AGITATION: NORMAL ACTIVITY
ORIENTATION AND CLOUDING OF SENSORIUM: ORIENTED AND CAN DO SERIAL ADDITIONS
AUDITORY DISTURBANCES: NOT PRESENT
HEADACHE, FULLNESS IN HEAD: NOT PRESENT
VISUAL DISTURBANCES: NOT PRESENT
TREMOR: TREMOR NOT VISIBLE BUT CAN BE FELT, FINGERTIP TO FINGERTIP
ORIENTATION AND CLOUDING OF SENSORIUM: ORIENTED AND CAN DO SERIAL ADDITIONS
TOTAL SCORE: 2

## 2017-10-29 ASSESSMENT — PAIN SCALES - GENERAL
PAINLEVEL_OUTOF10: 7

## 2017-10-29 NOTE — CARE PLAN
Problem: Pain Management  Goal: Pain level will decrease to patient's comfort goal  Outcome: PROGRESSING SLOWER THAN EXPECTED    Intervention: Follow pain managment plan developed in collaboration with patient and Interdisciplinary Team  Substernal chest pain on R side is consistently 7/10 while breathing in, patient on oxycodone 10mg PRN per MAR and morphine 4mg PRN per MAR.       Problem: Hemodynamic Status  Goal: Stable Vital Signs and Fluid Balance  Outcome: PROGRESSING AS EXPECTED    Intervention: Vital Signs, Cardiac Monitoring, Pulse Oximetry  VS q4 hours, tele monitor  Intervention: Monitor I & O, Manage IV fluids and IV infusions  Education provided to patient regarding fluids and need for lasix  Intervention: Daily Weights  Done  Intervention: Assess peripheral pulses and capillary refill  Radial pulses 2+ bilaterally, pedal pulses 1+ bilaterally. Capillary refill <3 seconds in all extremities  Intervention: Position Patient for Maximum Circulation/Cardiac Output  Patient moves self independently; sitting up in bed and ambulating ad abdoulaye

## 2017-10-29 NOTE — CARE PLAN
Problem: Safety  Goal: Will remain free from injury  Outcome: PROGRESSING AS EXPECTED  Pt educated about fall risk and verbalized understanding. Bed in lowest and locked position, call light and personal possessions within reach.     Problem: Knowledge Deficit  Goal: Knowledge of disease process/condition, treatment plan, diagnostic tests, and medications will improve  Outcome: PROGRESSING AS EXPECTED  Pt educated about medications with each administration. All questions answered.

## 2017-10-29 NOTE — DISCHARGE INSTRUCTIONS
Discharge Instructions    Discharged to home by car with friend. Discharged via wheelchair, hospital escort: Refused.  Special equipment needed: Not Applicable    Be sure to schedule a follow-up appointment with your primary care doctor or any specialists as instructed.     Discharge Plan:   Pneumococcal Vaccine Given - only chart on this line when given: Given (See MAR)  Influenza Vaccine Indication: Indicated: 9 to 64 years of age  Influenza Vaccine Given - only chart on this line when given: Influenza Vaccine Given (See MAR)    I understand that a diet low in cholesterol, fat, and sodium is recommended for good health. Unless I have been given specific instructions below for another diet, I accept this instruction as my diet prescription.   Other diet: Heart-Healthy Eating Plan  Many factors influence your heart health, including eating and exercise habits. Heart (coronary) risk increases with abnormal blood fat (lipid) levels. Heart-healthy meal planning includes limiting unhealthy fats, increasing healthy fats, and making other small dietary changes. This includes maintaining a healthy body weight to help keep lipid levels within a normal range.  WHAT IS MY PLAN?   Your health care provider recommends that you:  · Get no more than _________% of the total calories in your daily diet from fat.  · Limit your intake of saturated fat to less than _________% of your total calories each day.  · Limit the amount of cholesterol in your diet to less than _________ mg per day.  WHAT TYPES OF FAT SHOULD I CHOOSE?  · Choose healthy fats more often. Choose monounsaturated and polyunsaturated fats, such as olive oil and canola oil, flaxseeds, walnuts, almonds, and seeds.  · Eat more omega-3 fats. Good choices include salmon, mackerel, sardines, tuna, flaxseed oil, and ground flaxseeds. Aim to eat fish at least two times each week.  · Limit saturated fats. Saturated fats are primarily found in animal products, such as meats,  "butter, and cream. Plant sources of saturated fats include palm oil, palm kernel oil, and coconut oil.  · Avoid foods with partially hydrogenated oils in them. These contain trans fats. Examples of foods that contain trans fats are stick margarine, some tub margarines, cookies, crackers, and other baked goods.  WHAT GENERAL GUIDELINES DO I NEED TO FOLLOW?  · Check food labels carefully to identify foods with trans fats or high amounts of saturated fat.  · Fill one half of your plate with vegetables and green salads. Eat 4-5 servings of vegetables per day. A serving of vegetables equals 1 cup of raw leafy vegetables, ½ cup of raw or cooked cut-up vegetables, or ½ cup of vegetable juice.  · Fill one fourth of your plate with whole grains. Look for the word \"whole\" as the first word in the ingredient list.  · Fill one fourth of your plate with lean protein foods.  · Eat 4-5 servings of fruit per day. A serving of fruit equals one medium whole fruit, ¼ cup of dried fruit, ½ cup of fresh, frozen, or canned fruit, or ½ cup of 100% fruit juice.  · Eat more foods that contain soluble fiber. Examples of foods that contain this type of fiber are apples, broccoli, carrots, beans, peas, and barley. Aim to get 20-30 g of fiber per day.  · Eat more home-cooked food and less restaurant, buffet, and fast food.  · Limit or avoid alcohol.  · Limit foods that are high in starch and sugar.  · Avoid fried foods.  · Cook foods by using methods other than frying. Baking, boiling, grilling, and broiling are all great options. Other fat-reducing suggestions include:  ¨ Removing the skin from poultry.  ¨ Removing all visible fats from meats.  ¨ Skimming the fat off of stews, soups, and gravies before serving them.  ¨ Steaming vegetables in water or broth.  · Lose weight if you are overweight. Losing just 5-10% of your initial body weight can help your overall health and prevent diseases such as diabetes and heart disease.  · Increase your " consumption of nuts, legumes, and seeds to 4-5 servings per week. One serving of dried beans or legumes equals ½ cup after being cooked, one serving of nuts equals 1½ ounces, and one serving of seeds equals ½ ounce or 1 tablespoon.  · You may need to monitor your salt (sodium) intake, especially if you have high blood pressure. Talk with your health care provider or dietitian to get more information about reducing sodium.  WHAT FOODS CAN I EAT?  Grains  Breads, including Spanish, white, laureano, wheat, raisin, rye, oatmeal, and Italian. Tortillas that are neither fried nor made with lard or trans fat. Low-fat rolls, including hotdog and hamburger buns and English muffins. Biscuits. Muffins. Waffles. Pancakes. Light popcorn. Whole-grain cereals. Flatbread. Tammy toast. Pretzels. Breadsticks. Rusks. Low-fat snacks and crackers, including oyster, saltine, matzo, familia, animal, and rye. Rice and pasta, including brown rice and those that are made with whole wheat.  Vegetables  All vegetables.  Fruits  All fruits, but limit coconut.  Meats and Other Protein Sources  Lean, well-trimmed beef, veal, pork, and lamb. Chicken and turkey without skin. All fish and shellfish. Wild duck, rabbit, pheasant, and venison. Egg whites or low-cholesterol egg substitutes. Dried beans, peas, lentils, and tofu. Seeds and most nuts.  Dairy  Low-fat or nonfat cheeses, including ricotta, string, and mozzarella. Skim or 1% milk that is liquid, powdered, or evaporated. Buttermilk that is made with low-fat milk. Nonfat or low-fat yogurt.  Beverages  Mineral water. Diet carbonated beverages.  Sweets and Desserts  Sherbets and fruit ices. Honey, jam, marmalade, jelly, and syrups. Meringues and gelatins. Pure sugar candy, such as hard candy, jelly beans, gumdrops, mints, marshmallows, and small amounts of dark chocolate. Pranay food cake.  Eat all sweets and desserts in moderation.  Fats and Oils  Nonhydrogenated (trans-free) margarines. Vegetable  oils, including soybean, sesame, sunflower, olive, peanut, safflower, corn, canola, and cottonseed. Salad dressings or mayonnaise that are made with a vegetable oil. Limit added fats and oils that you use for cooking, baking, salads, and as spreads.  Other  Cocoa powder. Coffee and tea. All seasonings and condiments.  The items listed above may not be a complete list of recommended foods or beverages. Contact your dietitian for more options.  WHAT FOODS ARE NOT RECOMMENDED?  Grains  Breads that are made with saturated or trans fats, oils, or whole milk. Croissants. Butter rolls. Cheese breads. Sweet rolls. Donuts. Buttered popcorn. Chow mein noodles. High-fat crackers, such as cheese or butter crackers.  Meats and Other Protein Sources  Fatty meats, such as hotdogs, short ribs, sausage, spareribs, vogel, ribeye roast or steak, and mutton. High-fat deli meats, such as salami and bologna. Caviar. Domestic duck and goose. Organ meats, such as kidney, liver, sweetbreads, brains, gizzard, chitterlings, and heart.  Dairy  Cream, sour cream, cream cheese, and creamed cottage cheese. Whole milk cheeses, including blue (marium), [place], Brie, Korey, American, Havarti, Swiss, cheddar, Camembert, and [place].  Whole or 2% milk that is liquid, evaporated, or condensed. Whole buttermilk. Cream sauce or high-fat cheese sauce. Yogurt that is made from whole milk.  Beverages  Regular sodas and drinks with added sugar.  Sweets and Desserts  Frosting. Pudding. Cookies. Cakes other than aidan food cake. Candy that has milk chocolate or white chocolate, hydrogenated fat, butter, coconut, or unknown ingredients. Buttered syrups. Full-fat ice cream or ice cream drinks.  Fats and Oils  Gravy that has suet, meat fat, or shortening. Cocoa butter, hydrogenated oils, palm oil, coconut oil, palm kernel oil. These can often be found in baked products, candy, fried foods, nondairy creamers, and whipped toppings. Solid fats and shortenings,  including vogel fat, salt pork, lard, and butter. Nondairy cream substitutes, such as coffee creamers and sour cream substitutes. Salad dressings that are made of unknown oils, cheese, or sour cream.  The items listed above may not be a complete list of foods and beverages to avoid. Contact your dietitian for more information.     This information is not intended to replace advice given to you by your health care provider. Make sure you discuss any questions you have with your health care provider.     Document Released: 09/26/2009 Document Revised: 01/08/2016 Document Reviewed: 06/11/2015  [employer] Interactive Patient Education ©2016 [employer]      Special Instructions:  Diet low fat, low sugar, low sodium, heart healthy with 9-13 servings of fruits and vegetables   Activities as tolerated   Follow ups with PCP in 7-10 days, call for appointment   Meds per med rec sheet   No smoking, no alcohol, no caffeine   Wear seat belt in motorized vehicle   Take medications as perscribed   Keep appointments   If symptoms worsen call PCP, 911 or urgent care    [facility] Patient Discharge Instructions  · Monitor your weight daily, and maintain a weight chart, to track your weight changes.   · Activity as tolerated, unless your Doctor has ordered otherwise. Other activity order: as tolerated.  · Follow a low fat, low cholesterol, low salt diet unless instructed otherwise by your Doctor. Read the labels on the back of food products and track your intake of fat, cholesterol and salt.   · Fluid Restriction No. If a Fluid Restriction has been ordered by your Doctor, measure fluids with a measuring cup to ensure that you are not exceeding the restriction.   · No smoking.  · Oxygen No. If your Doctor has ordered that you wear Oxygen at home, it is important to wear it as ordered.  · Did you receive an explanation from staff on the importance of taking each of your medications and why it is necessary to keep taking them unless your doctor  says to stop? Yes  · Were all of your questions answered about how to manage your heart failure and what to do if you have increased signs and symptoms after you go home? Yes  · Do you feel like your heart failure care team involved you in the care treatment plan and allowed you to make decisions regarding your care while in the hospital and addressed any discharge needs you might have? Yes    See the educational handout provided at discharge for more information on monitoring your daily weight, activity and diet. This also explains more about Heart Failure, symptoms of a flare-up and some of the tests that you have undergone.     Warning Signs of a Flare-Up include:  · Swelling in the ankles or lower legs.  · Shortness of breath, while at rest, or while doing normal activities.   · Shortness of breath at night when in bed, or coughing in bed.   · Requiring more pillows to sleep at night, or needing to sit up at night to sleep.  · Feeling weak, dizzy or fatigued.     When to call your Doctor:  · [facility] week from 8:00 a.m. to 8:00 p.m. for medical questions [phone].  · Call your Primary Care Physician or Cardiologist if:   1. You experience any pain radiating to your jaw or neck.  2. You have any difficulty breathing.  3. You experience weight gain of 3 lbs in a day or 5 lbs in a week.   4. You feel any palpitations or irregular heartbeats.  5. You become dizzy or lose consciousness.   If you have had an angiogram or had a pacemaker or AICD placed, and experience:  1. Bleeding, drainage or swelling at the surgical / puncture site.  2. Fever greater than 100.0 F  3. Shock from internal defibrillator.  4. Cool and / or numb extremities.          Angina Pectoris  Angina pectoris, often called angina, is extreme discomfort in the chest, neck, or arm. This is caused by a lack of blood in the middle and thickest layer of the heart wall (myocardium). There are four types of  angina:  · Stable angina. Stable angina usually occurs in episodes of predictable frequency and duration. It is usually brought on by physical activity, stress, or excitement. Stable angina usually lasts a few minutes and can often be relieved by a medicine that you place under your tongue. This medicine is called sublingual nitroglycerin.  · Unstable angina. Unstable angina can occur even when you are doing little or no physical activity. It can even occur while you are sleeping or when you are at rest. It can suddenly increase in severity or frequency. It may not be relieved by sublingual nitroglycerin, and it can last up to 30 minutes.  · Microvascular angina. This type of angina is caused by a disorder of tiny blood vessels called arterioles. Microvascular angina is more common in women. The pain may be more severe and last longer than other types of angina pectoris.  · Prinzmetal or variant angina. This type of angina pectoris is rare and usually occurs when you are doing little or no physical activity. It especially occurs in the early morning hours.  CAUSES  Atherosclerosis is the cause of angina. This is the buildup of fat and cholesterol (plaque) on the inside of the arteries. Over time, the plaque may narrow or block the artery, and this will lessen blood flow to the heart. Plaque can also become weak and break off within a coronary artery to form a clot and cause a sudden blockage.  RISK FACTORS  Risk factors common to both men and women include:  · High cholesterol levels.  · High blood pressure (hypertension).  · Tobacco use.  · Diabetes.  · Family history of angina.  · Obesity.  · Lack of exercise.  · A diet high in saturated fats.  Women are at greater risk for angina if they are:  · Over age 55.  · Postmenopausal.  SYMPTOMS  Many people do not experience any symptoms during the early stages of angina. As the condition progresses, symptoms common to both men and women may include:  · Chest  pain.  ¨ The pain can be described as a crushing or squeezing in the chest, or a tightness, pressure, fullness, or heaviness in the chest.  ¨ The pain can last more than a few minutes, or it can stop and recur.  · Pain in the arms, neck, jaw, or back.  · Unexplained heartburn or indigestion.  · Shortness of breath.  · Nausea.  · Sudden cold sweats.  · Sudden light-headedness.  Many women have chest discomfort and some of the other symptoms. However, women often have different (atypical) symptoms, such as:   · Fatigue.  · Unexplained feelings of nervousness or anxiety.  · Unexplained weakness.  · Dizziness or fainting.  Sometimes, women may have angina without any symptoms.  DIAGNOSIS   Tests to diagnose angina may include:  · ECG (electrocardiogram).  · Exercise stress test. This looks for signs of blockage when the heart is being exercised.  · Pharmacologic stress test. This test looks for signs of blockage when the heart is being stressed with a medicine.  · Blood tests.  · Coronary angiogram. This is a procedure to look at the coronary arteries to see if there is any blockage.  TREATMENT   The treatment of angina may include the following:  · Healthy behavioral changes to reduce or control risk factors.  · Medicine.  · Coronary stenting. A stent helps to keep an artery open.  · Coronary angioplasty. This procedure widens a narrowed or blocked artery.  · Coronary artery bypass surgery. This will allow your blood to pass the blockage (bypass) to reach your heart.  HOME CARE INSTRUCTIONS   · Take medicines only as directed by your health care provider.  · Do not take the following medicines unless your health care provider approves:  ¨ Nonsteroidal anti-inflammatory drugs (NSAIDs), such as ibuprofen, naproxen, or celecoxib.  ¨ Vitamin supplements that contain vitamin A, vitamin E, or both.  ¨ Hormone replacement therapy that contains estrogen with or without progestin.  · Manage other health conditions such as  hypertension and diabetes as directed by your health care provider.  · Follow a heart-healthy diet. A dietitian can help to educate you about healthy food options and changes.  · Use healthy cooking methods such as roasting, grilling, broiling, baking, poaching, steaming, or stir-frying. Talk to a dietitian to learn more about healthy cooking methods.  · Follow an exercise program approved by your health care provider.  · Maintain a healthy weight. Lose weight as approved by your health care provider.  · Plan rest periods when fatigued.  · Learn to manage stress.  · Do not use any tobacco products, including cigarettes, chewing tobacco, or electronic cigarettes. If you need help quitting, ask your health care provider.  · If you drink alcohol, and your health care provider approves, limit your alcohol intake to no more than 1 drink per day. One drink equals 12 ounces of beer, 5 ounces of wine, or 1½ ounces of hard liquor.  · Stop illegal drug use.  · Keep all follow-up visits as directed by your health care provider. This is important.  SEEK IMMEDIATE MEDICAL CARE IF:   · You have pain in your chest, neck, arm, jaw, stomach, or back that lasts more than a few minutes, is recurring, or is unrelieved by taking sublingual nitroglycerin.  · You have profuse sweating without cause.  · You have unexplained:  ¨ Heartburn or indigestion.  ¨ Shortness of breath or difficulty breathing.  ¨ Nausea or vomiting.  ¨ Fatigue.  ¨ Feelings of nervousness or anxiety.  ¨ Weakness.  ¨ Diarrhea.  · You have sudden light-headedness or dizziness.  · You faint.  These symptoms may represent a serious problem that is an emergency. Do not wait to see if the symptoms will go away. Get medical help right away. Call your local emergency services (911 in the [place] Do not drive yourself to the hospital.     This information is not intended to replace advice given to you by your health care provider. Make sure you discuss any questions you have  with your health care provider.     Document Released: 12/18/2006 Document Revised: 01/08/2016 Document Reviewed: 04/21/2015  [employer] Interactive Patient Education ©2016 [employer].        Hepatitis C  Hepatitis C is a viral infection of the liver. It can lead to scarring of the liver (cirrhosis), liver failure, or liver cancer. Hepatitis C may go undetected for months or years because people with the infection may not have symptoms, or they may have only mild symptoms.  CAUSES   Hepatitis C is caused by the hepatitis C virus (HCV). The virus can be passed from one person to another through:  · Blood.  · Contaminated needles, such as those used for tattooing, body piercing, acupuncture, or injecting drugs.  · Having unprotected sex with an infected person.  · Childbirth.  · Blood transfusions or organ transplants done in the [place] before 1992.  RISK FACTORS  Risk factors for hepatitis C include:  · Having unprotected sex with an infected person.  · Using illegal drugs.  SIGNS AND SYMPTOMS   Symptoms of hepatitis C may include:  · Fatigue.  · Loss of appetite.  · Nausea.  · Vomiting.  · Abdominal pain.  · Dark yellow urine.  · Yellowish skin and eyes (jaundice).  · Itching of the skin.  · Александр-colored bowel movements.  · Joint pain.  Symptoms are not always present.   DIAGNOSIS   Hepatitis C is diagnosed with blood tests. Other types of tests may also be done to check how your liver is functioning.  TREATMENT   Your health care provider may perform noninvasive tests or a liver biopsy to help determine the best course of treatment. Treatment for hepatitis C may include one or more medicines. Your health care provider may check you for a recurring infection or other liver conditions every 6-12 months after treatment.  HOME CARE INSTRUCTIONS   · Rest as needed.  · Take all medicines as directed by your health care provider.  · Do not take any medicine unless approved by your health care provider. This  includes over-the-counter medicine and birth control pills.  · Do not drink alcohol.  · Do not have sex until approved by your health care provider.  · Do not share toothbrushes, nail clippers, razors, or needles.  PREVENTION  There is no vaccine for hepatitis C. The only way to prevent the disease is to reduce the risk of exposure to the virus. This may be done by:  · Practicing safe sex and using condoms.  · Avoiding illegal drugs.  SEEK MEDICAL CARE IF:  · You have a fever.  · You develop abdominal pain.  · You develop dark urine.  · You have suellen-colored bowel movements.  · You develop joint pains.  SEEK IMMEDIATE MEDICAL CARE IF:  · You have increasing fatigue or weakness.  · You lose your appetite.  · You feel nauseous or vomit.  · You develop jaundice or your jaundice gets worse.  · You bruise or bleed easily.  MAKE SURE YOU:   · Understand these instructions.  · Will watch your condition.  · Will get help right away if you are not doing well or get worse.     This information is not intended to replace advice given to you by your health care provider. Make sure you discuss any questions you have with your health care provider.     Document Released: 12/15/2001 Document Revised: 01/08/2016 Document Reviewed: 04/01/2015  [employer] Interactive Patient Education ©2016 [employer]      · Is patient discharged on Warfarin / Coumadin?   No     · Is patient Post Blood Transfusion?  No    Depression / Suicide Risk    As you are discharged from this [facility] facility, it is important to learn how to keep safe from harming yourself.    Recognize the warning signs:  · Abrupt changes in personality, positive or negative- including increase in energy   · Giving away possessions  · Change in eating patterns- significant weight changes-  positive or negative  · Change in sleeping patterns- unable to sleep or sleeping all the time   · Unwillingness or inability to communicate  · Depression  · Unusual sadness,  discouragement and loneliness  · Talk of wanting to die  · Neglect of personal appearance   · Rebelliousness- reckless behavior  · Withdrawal from people/activities they love  · Confusion- inability to concentrate     If you or a loved one observes any of these behaviors or has concerns about self-harm, here's what you can do:  · Talk about it- your feelings and reasons for harming yourself  · Remove any means that you might use to hurt yourself (examples: pills, rope, extension cords, firearm)  · Get professional help from the community (Mental Health, Substance Abuse, psychological counseling)  · Do not be alone:Call your Safe Contact- someone whom you trust who will be there for you.  · Call your local CRISIS HOTLINE 037-4609 or 672-488-0889  · Call your local Children's Mobile Crisis Response Team [place] (099) 036-1149 or www.AllofMe  · Call the toll free National Suicide Prevention Hotlines   · National Suicide Prevention Lifeline 112-365-GNWO (8658)  · National Hope Line Network 800-SUICIDE (545-6747)

## 2017-10-29 NOTE — PROGRESS NOTES
"Interval History:  47-year-old male with nonischemic systolic congestive heart failure secondary to alcohol abuse and hypertensive heart disease. Continues drinking and did not follow-up after his last hospitalization when he was diagnosed. Presents with congestive heart failure symptoms and his ejection fraction is further reduced secondary to his noncompliance with medical therapy.  10/28: Tolerating restart of his medical therapy well. No signs of active withdrawal.  10/29: Tolerating his medical therapy, responding well to diuresis. Continues to have chest discomfort which is positional and worse with inspiration.    Physical Exam   Blood pressure 124/85, pulse (!) 101, temperature 36.4 °C (97.6 °F), resp. rate 18, height 1.778 m (5' 10\"), weight 77.8 kg (171 lb 8.3 oz), SpO2 96 %.    Constitutional: Appears well-developed.   HENT: Normocephalic and atraumatic. No scleral icterus.   Neck: JVD present.   Cardiovascular: Normal rate. Exam reveals no gallop and no friction rub. No murmur heard.   Pulmonary/Chest: CTAB    Abdominal: S/NT/ND BS+   Musculoskeletal: Exhibits edema. Pulses present.  Skin: Skin is warm and dry.     ROS: As HPI other reviewed and negative       Intake/Output Summary (Last 24 hours) at 10/29/17 0856  Last data filed at 10/29/17 0800   Gross per 24 hour   Intake             1200 ml   Output             4950 ml   Net            -3750 ml       Recent Labs      10/26/17   1507   WBC  6.2   RBC  3.78*   HEMOGLOBIN  12.9*   HEMATOCRIT  36.6*   MCV  96.8   MCH  34.1*   MCHC  35.2   RDW  40.8   PLATELETCT  183   MPV  9.4     Recent Labs      10/27/17   0331  10/28/17   0139  10/29/17   0149   SODIUM  134*  134*  135   POTASSIUM  3.9  4.0  4.6   CHLORIDE  101  101  99   CO2  22  25  25   GLUCOSE  161*  198*  114*   BUN  16  14  14   CREATININE  0.71  0.87  0.88   CALCIUM  8.9  8.3*  8.6         Recent Labs      10/26/17   1507   BNPBTYPENAT  281*     Recent Labs      10/26/17   1507  10/26/17   " 1833  10/26/17   2245  10/27/17   0331   TROPONINI  0.04  0.05*  0.05*  0.04   BNPBTYPENAT  281*   --    --    --      Recent Labs      10/27/17   0331   TRIGLYCERIDE  55   HDL  110   LDL  89       No current facility-administered medications on file prior to encounter.      No current outpatient prescriptions on file prior to encounter.       Current Facility-Administered Medications   Medication Dose Frequency Provider Last Rate Last Dose   • [START ON 10/30/2017] spironolactone (ALDACTONE) tablet 12.5 mg  12.5 mg Q DAY David Lazo M.D.       • furosemide (LASIX) tablet 40 mg  40 mg Q DAY David Lazo M.D.       • famotidine (PEPCID) injection 20 mg  20 mg BID David Lazo M.D.   20 mg at 10/29/17 0834   • metoprolol SR (TOPROL XL) tablet 25 mg  25 mg Q DAY David Lazo M.D.   Stopped at 10/28/17 0915   • aspirin EC (ECOTRIN) tablet 81 mg  81 mg DAILY David Lazo M.D.   81 mg at 10/29/17 0759   • lisinopril (PRINIVIL) 10 MG tablet 10 mg  10 mg Q DAY LASHA Bazan.OEloise   10 mg at 10/29/17 0759   • digoxin (LANOXIN) tablet 125 mcg  125 mcg DAILY AT 1800 Xu Burns M.D.   125 mcg at 10/28/17 1820   • enoxaparin (LOVENOX) inj 40 mg  40 mg DAILY LASHA Bazan.O.   40 mg at 10/29/17 0800   • senna-docusate (PERICOLACE or SENOKOT S) 8.6-50 MG per tablet 2 Tab  2 Tab BID LASHA Bazan.O.   2 Tab at 10/29/17 0800    And   • polyethylene glycol/lytes (MIRALAX) PACKET 1 Packet  1 Packet QDAY PRN James Quinn D.O.        And   • magnesium hydroxide (MILK OF MAGNESIA) suspension 30 mL  30 mL QDAY PRN James Bunuel-Chio, D.O.        And   • bisacodyl (DULCOLAX) suppository 10 mg  10 mg QDAY PRN James Quinn D.O.       • Respiratory Care per Protocol   Continuous RT James Quinn D.O.       • acetaminophen (TYLENOL) tablet 650 mg  650 mg Q6HRS PRN HO aBzanO.   650 mg at 10/27/17 0159   • Pharmacy Consult  Request ...Pain Management Review   PRN LASHA Bazan.OEloise        And   • oxycodone immediate-release (ROXICODONE) tablet 5 mg  5 mg Q3HRS PRN LASHA Bazan.OEloise        And   • oxycodone immediate release (ROXICODONE) tablet 10 mg  10 mg Q3HRS PRN James Quinn D.O.   10 mg at 10/29/17 0800    And   • morphine (pf) 4 mg/ml injection 4 mg  4 mg Q3HRS PRN James Quinn D.O.   4 mg at 10/29/17 0402   • ondansetron (ZOFRAN) syringe/vial injection 4 mg  4 mg Q4HRS PRN James Quinn D.O.   4 mg at 10/26/17 2318   • ondansetron (ZOFRAN ODT) dispertab 4 mg  4 mg Q4HRS PRN LASHA Bazan.O.   4 mg at 10/29/17 0009   • promethazine (PHENERGAN) tablet 12.5-25 mg  12.5-25 mg Q4HRS PRN LASHA Bazan.O.       • promethazine (PHENERGAN) suppository 12.5-25 mg  12.5-25 mg Q4HRS PRN James Quinn D.O.       • prochlorperazine (COMPAZINE) injection 5-10 mg  5-10 mg Q4HRS PRN LASHA Bazan.O.       • temazepam (RESTORIL) capsule 15 mg  15 mg HS PRN - MR X 1 James Quinn D.O.   15 mg at 10/28/17 0151   • nicotine (NICODERM) 14 MG/24HR 14 mg  14 mg Daily-0600 LASHA Bazan.O.   Stopped at 10/27/17 0600    And   • nicotine polacrilex (NICORETTE) 2 MG piece 2 mg  2 mg Q HOUR PRN James Quinn D.O.   2 mg at 10/27/17 0159   • thiamine (THIAMINE) tablet 50 mg  50 mg DAILY LASHA Bazan.O.   50 mg at 10/29/17 0759   • folic acid (FOLVITE) tablet 1 mg  1 mg DAILY HO BazanOEloise   1 mg at 10/29/17 0759   • multivitamin (THERAGRAN) tablet 1 Tab  1 Tab DAILY HO BazanO.   1 Tab at 10/29/17 0759   • albuterol inhaler 2 Puff  2 Puff Q4HRS PRN Trevin Sheikh M.D.   2 Puff at 10/28/17 0153   • lorazepam (ATIVAN) tablet 0.5 mg  0.5 mg Q4HRS PRN Hadley Bill M.D.       • lorazepam (ATIVAN) tablet 1 mg  1 mg Q4HRS PRN Hadley Bill M.D.   1 mg at 10/28/17 0801     Or   • lorazepam (ATIVAN) injection 0.5 mg  0.5 mg Q4HRS PRN Hadley Bill M.D.       • lorazepam (ATIVAN) tablet 2 mg  2 mg Q2HRS PRN Hadley Bill M.D.        Or   • lorazepam (ATIVAN) injection 1 mg  1 mg Q2HRS PRN Hadley Bill M.D.       • lorazepam (ATIVAN) tablet 3 mg  3 mg Q HOUR PRN Hadley Bill M.D.        Or   • lorazepam (ATIVAN) injection 1.5 mg  1.5 mg Q HOUR PRN Hadley Bill M.D.       • lorazepam (ATIVAN) tablet 4 mg  4 mg Q15 MIN PRN Hadley Bill M.D.        Or   • lorazepam (ATIVAN) injection 2 mg  2 mg Q15 MIN PRN Hadley Bill M.D.       • nitroglycerin (NITROSTAT) tablet 0.4 mg  0.4 mg Q5 MIN PRN Hadley Bill M.D.   0.4 mg at 10/26/17 2311     Last reviewed on 10/26/2017  8:01 PM by Sylwia Murphy R.N.  Medications reviewed    Imaging reviewed    ECHO(10/27/2017):  Compared to the images of the study done 10/19/16 - there has been   slight further reduction in the ejection fraction from 30% to 20%.   Left ventricle is mildly dilated.  Left ventricular ejection fraction is visually estimated to be 20%.  Grade III diastolic dysfunction (restrictive pattern).  Moderately dilated right ventricle.  Reduced right ventricular systolic function.  Mild mitral regurgitation.  Right ventricular systolic pressure is estimated to be 25 mmHg    Impressions:  1. Nonischemic cardiomyopathy  2. Alcoholic cardiomyopathy  3. Decompensated systolic heart failure, LVEF 20%  4. Reduced right ventricular systolic function  5. Alcohol abuse  6. Medication noncompliance  7. Hypertension    Recommendations:  Continue aspirin, lisinopril, spironolactone.  Agree with addition beta blocker today. Would recommend carvedilol due to cost.  Continue diuresis until euvolemic  Recommend abstinence from alcohol and improved medical compliance  Adam[place] that he goes, clearly in early alcohol withdrawal. ECG unchanged, troponin nonspecific and related to  CHF.

## 2017-10-29 NOTE — DISCHARGE SUMMARY
CHIEF COMPLAINT ON ADMISSION  Chief Complaint   Patient presents with   • Shortness of Breath       CODE STATUS  Full Code    HPI & HOSPITAL COURSE  This is a 47 y.o. male here with chest pain and shortness of breath. Patient was admitted to telemetry. Cardiology was consulted for CHF and elevated troponin levels. He had a coronary angiogram last year that was normal. ECHO done during this admission showed a greatly reduce EF of 15%. He was started on appropriate therapies for CHF and his symptoms did improve somewhat during this admission. His sCHF is likely a result of ETOH related cardiomyopathy. He also has a remote history of IV meth use, but none recently in the last several years. Patient exhibited mild signs and symptoms of ETOH WD and was placed on CIWA protocol, but on day of discharge, his CIWA was around 2 and he was medically stable with normal electrolytes.    Of note, patient was tested for hepatitis and came back HCV positive with HCV VL RNA PCR of around 5 million. His U/S abdomen only shows hepatic steatosis at this time, but I informed the patient that he needs to completely abstain from ETOH and needs to seek out care with a hepatologist in the community for possible [place] treatment. His HIV ab test was negative.    Therefore, he is discharged in fair and stable condition with close outpatient follow-up.    SPECIFIC OUTPATIENT FOLLOW-UP  -F/U with his PCP in 1-2 weeks  -F/U with his Cardiologist in 1-2 weeks    DISCHARGE PROBLEM LIST  Active Problems:    Cardiomyopathy (CMS-HCC) POA: Yes    ETOH abuse POA: Unknown    Chronic hepatitis C without hepatic coma (CMS-HCC) POA: Yes  Resolved Problems:    Uncontrolled hypertension POA: Yes    Chest pain POA: Yes    Acute systolic congestive heart failure (CMS-HCC) POA: Yes      FOLLOW UP  Future Appointments  Date Time Provider Department Center   11/14/2017 10:45 AM Nixon Rubin M.D. [facility] None     Vida Zarco M.D.  [address]  [place]  [phone]  On 11/2/2017  please check in at 9am for a 930 am appointment. Please bring Photo ID, proof of residence, proof of income. [facility] has a sliding scale fee.     Nixon Rubin M.D.  [address]  [address]  [place] [id number]  [phone]    In 2 weeks  Follow up for heart failure      MEDICATIONS ON DISCHARGE   Kenrtell Li   Home Medication Instructions BERNA:[id number]    Printed on:10/29/17 1628   Medication Information                      aspirin (ASA) 81 MG Chew Tab chewable tablet  Take 81 mg by mouth every day.             digoxin (LANOXIN) 125 MCG Tab  Take 1 Tab by mouth every day at 6 PM.             famotidine (PEPCID) 20 MG Tab  Take 1 Tab by mouth 2 Times a Day.             folic acid (FOLVITE) 1 MG Tab  Take 1 Tab by mouth every day.             furosemide (LASIX) 40 MG Tab  Take 1 Tab by mouth every day.             lisinopril (PRINIVIL) 10 MG Tab  Take 1 Tab by mouth every day.             metoprolol SR (TOPROL XL) 25 MG TABLET SR 24 HR  Take 1 Tab by mouth every day.             multivitamin (THERAGRAN) Tab  Take 1 Tab by mouth every day.             spironolactone (ALDACTONE) 25 MG Tab  Take 0.5 Tabs by mouth every day.             thiamine 50 MG tablet  Take 1 Tab by mouth every day.                 DIET  Orders Placed This Encounter   Procedures   • DIET ORDER     Standing Status:   Standing     Number of Occurrences:   1     Order Specific Question:   Diet:     Answer:   Cardiac [6]     Order Specific Question:   Diet:     Answer:   2 Gram Sodium [7]   • DISCONTINUE DIET TRAY     Standing Status:   Standing     Number of Occurrences:   1       ACTIVITY  As tolerated.  Weight bearing as tolerated      CONSULTATIONS  -Cardiology    PROCEDURES  ECHOCARDIOGRAM COMP W/O CONT   Final Result      US-GALLBLADDER   Final Result      Fatty infiltration appearance of the liver. Otherwise negative right upper quadrant ultrasound.         CT-CTA CHEST PULMONARY ARTERY W/ RECONS   Final Result       1.  No evidence of pulmonary embolism.      2.  Clear lung parenchyma.            DX-CHEST-PORTABLE (1 VIEW)   Final Result         1. No acute cardiopulmonary abnormalities are identified.        ECHO-  Compared to the images of the study done 10/19/16 - there has been   slight further reduction in the ejection fraction from 30% to 20%.   Left ventricle is mildly dilated.  Left ventricular ejection fraction is visually estimated to be 20%.  Grade III diastolic dysfunction (restrictive pattern).  Moderately dilated right ventricle.  Reduced right ventricular systolic function.  Mild mitral regurgitation.  Right ventricular systolic pressure is estimated to be 25 mmHg.    LABORATORY  Lab Results   Component Value Date/Time    SODIUM 135 10/29/2017 01:49 AM    POTASSIUM 4.6 10/29/2017 01:49 AM    CHLORIDE 99 10/29/2017 01:49 AM    CO2 25 10/29/2017 01:49 AM    GLUCOSE 114 (H) 10/29/2017 01:49 AM    BUN 14 10/29/2017 01:49 AM    CREATININE 0.88 10/29/2017 01:49 AM        Lab Results   Component Value Date/Time    WBC 6.2 10/26/2017 03:07 PM    HEMOGLOBIN 12.9 (L) 10/26/2017 03:07 PM    HEMATOCRIT 36.6 (L) 10/26/2017 03:07 PM    PLATELETCT 183 10/26/2017 03:07 PM        Total time of the discharge process exceeds 38 minutes

## 2017-10-29 NOTE — PROGRESS NOTES
Report received at bedside, patient care assumed, tele box on. Pt AAO x 4, no signs of distress noted at this time. POC discussed with pt and all questions answered. Pt c/o 7/10 pain in mid chest. Medicated per MAR. Pt denies any additional needs at this time. Bed in lowest position, pt educated on fall risk and verbalized understanding. Call light and personal possessions within reach. Will continue to monitor.

## 2017-10-29 NOTE — PROGRESS NOTES
Bedside report received, Pt care assumed. Tele box on. VSS. Pt assessment complete. Pt AOX4, no signs of distress noted at this time.  Pt c/o of 0/10 pain. Pt denies any additional needs at this time. Bed in lowest position, ambulates with no assistance. POC discussed with Pt/family, verbalizes understanding, no questions at this time. Pt educated on fall risk and verbalizes understanding, call light within reach, will continue to monitor.

## 2017-10-29 NOTE — PROGRESS NOTES
Discharge instructions given and discussed. Pt verbalized understanding and all questions answered. Prescriptions send to pharmacy electronically. Pt discharged home in stable condition via wheelchair with friend. Personal belongings verified with patient. VSS, IV removed and tolerated well. Tele box removed, monitor room notified.

## 2017-10-30 ENCOUNTER — PATIENT OUTREACH (OUTPATIENT)
Dept: HEALTH INFORMATION MANAGEMENT | Facility: OTHER | Age: 47
End: 2017-10-30

## 2017-10-30 NOTE — LETTER
Kentrell Li  [address] [place] [id number]    October 30, 2017      Dear Kentrell Li,    [facility] wants to ensure your discharge home is safe and you or your loved ones have had all of your questions answered regarding your care after you leave the hospital.    Our discharge team was unsuccessful in our attempts to contact you telephonically and we wanted to be sure that you had a list of resources and contact information should you have any questions regarding your hospital stay, follow-up instructions, or active medical symptoms.    Questions or Concerns Regarding… Contact   Medical Questions Related to Your Discharge  (7 days a week, 8am-5pm) Contact a Nurse Care Coordinator   [phone]   Medical Questions Not Related to Your Discharge  (24 hours a day / 7 days a week)  Contact the Nurse Health Line   [phone]    Medications or Discharge Instructions Refer to your discharge packet   or contact your -[phone]   Follow-up Appointment(s) Schedule your appointment via [employer]   or contact Scheduling [phone]   Billing Review your statement via Tango  or contact Billing [phone]   Medical Records Review your records via Tango   or contact Medical Records [phone]     You can also easily access your medical information, test results and upcoming appointments via the Tango free online health management tool. You can learn more and sign up at [employer]/Tango. For assistance setting up your Tango account, please call [phone].    Once again, we want to ensure your discharge home is safe and that you have a clear understanding of any next steps in your care. If you have any questions or concerns, please do not hesitate to contact us, we are here for you. Thank you for choosing [facility] for your healthcare needs.    Sincerely,      Your [facility] Healthcare Team

## 2017-10-30 NOTE — PROGRESS NOTES
Placed discharge outreach phone call to patient s/p hospital discharge 10/29/17.  Received recording stating that pt's voice mailbox is full.  No answer, no option to leave voicemail.  Discharge outreach letter mailed to patient.

## 2017-10-31 ENCOUNTER — TELEPHONE (OUTPATIENT)
Dept: CARDIOLOGY | Facility: MEDICAL CENTER | Age: 47
End: 2017-10-31

## 2017-10-31 NOTE — TELEPHONE ENCOUNTER
S/w pt, reports that medication would be cheaper for him to fill at [facility]. States that he is also wanting MD to prescribe something to help with his chest pain and sleep. Pt reports that CP is 7/10, the same pain he had while in hospital. States pain has never been relieved. Educated pt that if he is having active chest pain that he needs to go to the ER. Pt reports that he just left the hospital two days ago and doesn't think he needs to go. When asked, pt states that morphine and ativan helped his chest pain while in hospital. Educated pt that he would need to see his PCP for pain and anxiety medication. Pt reports that he does not have a PCP. Restated to pt that if he is having active CP that he needs to seek care at ER. Pt states understanding and will wait for return call tomorrow about medications.    Informed pt that I will follow up with Dr. Rubin on his recommendations.     To TW, please advise. (OK to fill current meds under you at [facility]? Pt has FU 11/14) Thanks     ----- Message from Katie Garvey Med Ass't sent at 10/31/2017 12:01 PM PDT -----  Regarding: FW: 9 meds - new pharmacy  Contact: [phone]  Patient has a follow up with TW in November, okay to fill meds under TW?    ----- Message -----  From: Sheree Farias  Sent: 10/31/2017  11:30 AM  To: Katie Garvey Med Ass't  Subject: 9 meds - new pharmacy                            JENNY/katie    Pt calling to ask you to change all 9 of his scripts to [facility] on [address]   Pt is asking you to remove [facility] altogether.  If questions, please call Kentrell at .    All 9 scripts were from his recent hospital stay.

## 2017-11-01 DIAGNOSIS — E87.6 HYPOKALEMIA: ICD-10-CM

## 2017-11-01 DIAGNOSIS — I42.6 ALCOHOLIC CARDIOMYOPATHY (HCC): ICD-10-CM

## 2017-11-01 DIAGNOSIS — I44.7 LBBB (LEFT BUNDLE BRANCH BLOCK): ICD-10-CM

## 2017-11-01 DIAGNOSIS — R10.13 EPIGASTRIC PAIN: ICD-10-CM

## 2017-11-01 LAB
HCV RNA SERPL NAA+PROBE-ACNC: ABNORMAL IU/ML
HCV RNA SERPL NAA+PROBE-LOG IU: 6.8 LOG IU
HCV RNA SERPL QL NAA+PROBE: DETECTED
PATHOLOGY STUDY: ABNORMAL

## 2017-11-01 RX ORDER — FUROSEMIDE 40 MG/1
40 TABLET ORAL DAILY
Qty: 30 TAB | Refills: 1 | Status: SHIPPED | OUTPATIENT
Start: 2017-11-01

## 2017-11-01 RX ORDER — METOPROLOL SUCCINATE 25 MG/1
25 TABLET, EXTENDED RELEASE ORAL DAILY
Qty: 30 TAB | Refills: 1 | Status: SHIPPED | OUTPATIENT
Start: 2017-11-01 | End: 2018-01-19 | Stop reason: SDUPTHER

## 2017-11-01 RX ORDER — DIGOXIN 125 MCG
125 TABLET ORAL DAILY
Qty: 30 TAB | Refills: 1 | Status: SHIPPED | OUTPATIENT
Start: 2017-11-01 | End: 2018-01-19 | Stop reason: SDUPTHER

## 2017-11-01 RX ORDER — LISINOPRIL 10 MG/1
10 TABLET ORAL DAILY
Qty: 30 TAB | Refills: 1 | Status: SHIPPED | OUTPATIENT
Start: 2017-11-01 | End: 2018-01-19 | Stop reason: SDUPTHER

## 2017-11-01 RX ORDER — FAMOTIDINE 20 MG/1
20 TABLET, FILM COATED ORAL 2 TIMES DAILY
Qty: 60 TAB | Refills: 1 | Status: SHIPPED | OUTPATIENT
Start: 2017-11-01

## 2017-11-01 RX ORDER — SPIRONOLACTONE 25 MG/1
12.5 TABLET ORAL DAILY
Qty: 30 TAB | Refills: 1 | Status: SHIPPED | OUTPATIENT
Start: 2017-11-01 | End: 2018-01-19 | Stop reason: SDUPTHER

## 2017-11-01 RX ORDER — FOLIC ACID 1 MG/1
1 TABLET ORAL DAILY
Qty: 30 TAB | Refills: 1 | Status: SHIPPED | OUTPATIENT
Start: 2017-11-01

## 2017-11-01 NOTE — TELEPHONE ENCOUNTER
He was chronically noncompliant and never made his follow-ups. He was seen in the hospital for acute decompensated heart failure where I saw him. He had noncardiac chest pain.      Okay to refill his cardiac medications including aspirin lisinopril spironolactone and his beta blocker under our office.    Under no circumstances will I prescribe Ativan/Valium or narcotic pain medications.    He has noncardiac chest pain and I recommended he follow-up with his primary physician for this. Also recommend he continue not to drink alcohol as he has alcoholic cardiomyopathy.    If he is feeling poorly I agree with his presentation to the emergency department.

## 2017-11-01 NOTE — TELEPHONE ENCOUNTER
S/w pt's girlfriend, Addis. Educated that per Dr. Rubin that he is OK refilling his cardiac medications at this time. Informed that he will not be prescribing any narcotics or anxiety medication. Girlfriend states understanding and is very pleasant on the phone. She just wants to make sure that his cardiac medications get submitted because pt has gone with out medication for the past two days. Reassured that medications will be submitted to [facility].     Girlfrienjoe urged patient to seek emergency care due to c/o unrelieved CP, pt not wanting to go to ER. Reassured SO to please have pt go to ER if CP worsens. She states understanding and denies further needs at this time.     Danii Keller R.N.      KEVYN/Idalia     Patient's girlfriend Addis is calling about his medications. She had called Dr. Adam last night when she was on-call, but she was unable to speak to patient, so Addis is calling back today. She can be reached at [phone]

## 2017-11-01 NOTE — TELEPHONE ENCOUNTER
Patient called this evening again. Received message regarding chest pain.   Tried calling patient back 3 times. Received a busy tone.     Charleen Adam MD  Cardiologist  [facility]

## 2018-01-19 ENCOUNTER — TELEPHONE (OUTPATIENT)
Dept: CARDIOLOGY | Facility: MEDICAL CENTER | Age: 48
End: 2018-01-19

## 2018-01-19 DIAGNOSIS — R10.13 EPIGASTRIC PAIN: ICD-10-CM

## 2018-01-19 DIAGNOSIS — I42.6 ALCOHOLIC CARDIOMYOPATHY (HCC): ICD-10-CM

## 2018-01-19 DIAGNOSIS — I44.7 LBBB (LEFT BUNDLE BRANCH BLOCK): ICD-10-CM

## 2018-01-19 DIAGNOSIS — E87.6 HYPOKALEMIA: ICD-10-CM

## 2018-01-19 RX ORDER — DIGOXIN 125 MCG
125 TABLET ORAL DAILY
Qty: 30 TAB | Refills: 0 | OUTPATIENT
Start: 2018-01-19

## 2018-01-19 RX ORDER — LISINOPRIL 10 MG/1
10 TABLET ORAL DAILY
Qty: 30 TAB | Refills: 0 | OUTPATIENT
Start: 2018-01-19

## 2018-01-19 RX ORDER — SPIRONOLACTONE 25 MG/1
12.5 TABLET ORAL DAILY
Qty: 15 TAB | Refills: 0 | OUTPATIENT
Start: 2018-01-19

## 2018-01-19 RX ORDER — METOPROLOL SUCCINATE 25 MG/1
25 TABLET, EXTENDED RELEASE ORAL DAILY
Qty: 30 TAB | Refills: 0 | OUTPATIENT
Start: 2018-01-19

## 2018-01-19 RX ORDER — FOLIC ACID 1 MG/1
TABLET ORAL
Refills: 1 | OUTPATIENT
Start: 2018-01-19

## 2018-01-20 NOTE — TELEPHONE ENCOUNTER
S/w pt's girlfriend, Dain. She reassures that pt will come to follow up appointment with Dr. Rubin on 2/2/18. Pt does not have insurance and they have been working on getting coverage, that is why he missed appointment back in November. Dain states that due to money situation that pt wouldn't be able to get any labs prior to appointment. She informs that pt has been taking his medication as directed from discharge from hospital. Educated her that if pt is unable to make appointment and symptoms worsen, that he really needs to be evaluated by ER. Dain states understanding and notes he WILL follow up.     Pt to work on getting insurance coverage.     ----- Message from Danii Okeefe sent at 1/19/2018  4:29 PM PST -----  Regarding: girlfriend calling for refills on medications  KEVYN/Idalia      Please call patient's girlfriend dain for refills on Spironolactone, Lisinopril, Digoxin and Metoprolol into [facility] on [address] She can be reached at [phone].

## 2018-03-29 ENCOUNTER — HOSPITAL ENCOUNTER (OUTPATIENT)
Dept: HOSPITAL 8 - ED | Age: 48
Setting detail: OBSERVATION
LOS: 1 days | Discharge: LEFT BEFORE DISCHARGE | End: 2018-03-30
Attending: INTERNAL MEDICINE | Admitting: INTERNAL MEDICINE
Payer: MEDICAID

## 2018-03-29 VITALS — WEIGHT: 155.87 LBS | BODY MASS INDEX: 22.31 KG/M2 | HEIGHT: 70 IN

## 2018-03-29 VITALS — DIASTOLIC BLOOD PRESSURE: 80 MMHG | SYSTOLIC BLOOD PRESSURE: 148 MMHG

## 2018-03-29 VITALS — SYSTOLIC BLOOD PRESSURE: 142 MMHG | DIASTOLIC BLOOD PRESSURE: 86 MMHG

## 2018-03-29 VITALS — DIASTOLIC BLOOD PRESSURE: 81 MMHG | SYSTOLIC BLOOD PRESSURE: 151 MMHG

## 2018-03-29 DIAGNOSIS — I50.9: ICD-10-CM

## 2018-03-29 DIAGNOSIS — I44.7: ICD-10-CM

## 2018-03-29 DIAGNOSIS — F15.90: ICD-10-CM

## 2018-03-29 DIAGNOSIS — R07.89: Primary | ICD-10-CM

## 2018-03-29 DIAGNOSIS — E87.6: ICD-10-CM

## 2018-03-29 DIAGNOSIS — I42.9: ICD-10-CM

## 2018-03-29 DIAGNOSIS — I25.2: ICD-10-CM

## 2018-03-29 DIAGNOSIS — J45.909: ICD-10-CM

## 2018-03-29 DIAGNOSIS — I25.10: ICD-10-CM

## 2018-03-29 DIAGNOSIS — I11.0: ICD-10-CM

## 2018-03-29 LAB
ALBUMIN SERPL-MCNC: 4 G/DL (ref 3.4–5)
ANION GAP SERPL CALC-SCNC: 13 MMOL/L (ref 5–15)
BASOPHILS # BLD AUTO: 0.03 X10^3/UL (ref 0–0.1)
BASOPHILS NFR BLD AUTO: 1 % (ref 0–1)
CALCIUM SERPL-MCNC: 8.6 MG/DL (ref 8.5–10.1)
CHLORIDE SERPL-SCNC: 106 MMOL/L (ref 98–107)
CREAT SERPL-MCNC: 1.27 MG/DL (ref 0.7–1.3)
EOSINOPHIL # BLD AUTO: 0.09 X10^3/UL (ref 0–0.4)
EOSINOPHIL NFR BLD AUTO: 2 % (ref 1–7)
ERYTHROCYTE [DISTWIDTH] IN BLOOD BY AUTOMATED COUNT: 12.7 % (ref 9.4–14.8)
LYMPHOCYTES # BLD AUTO: 1.21 X10^3/UL (ref 1–3.4)
LYMPHOCYTES NFR BLD AUTO: 25 % (ref 22–44)
MCH RBC QN AUTO: 34.6 PG (ref 27.5–34.5)
MCHC RBC AUTO-ENTMCNC: 34.6 G/DL (ref 33.2–36.2)
MCV RBC AUTO: 100 FL (ref 81–97)
MD: NO
MONOCYTES # BLD AUTO: 0.43 X10^3/UL (ref 0.2–0.8)
MONOCYTES NFR BLD AUTO: 9 % (ref 2–9)
NEUTROPHILS # BLD AUTO: 3.05 X10^3/UL (ref 1.8–6.8)
NEUTROPHILS NFR BLD AUTO: 63 % (ref 42–75)
PLATELET # BLD AUTO: 216 X10^3/UL (ref 130–400)
PMV BLD AUTO: 6.7 FL (ref 7.4–10.4)
RBC # BLD AUTO: 4.15 X10^6/UL (ref 4.38–5.82)
TROPONIN I SERPL-MCNC: < 0.015 NG/ML (ref 0–0.04)
TROPONIN I SERPL-MCNC: < 0.015 NG/ML (ref 0–0.04)

## 2018-03-29 PROCEDURE — 78452 HT MUSCLE IMAGE SPECT MULT: CPT

## 2018-03-29 PROCEDURE — 93005 ELECTROCARDIOGRAM TRACING: CPT

## 2018-03-29 PROCEDURE — A9502 TC99M TETROFOSMIN: HCPCS

## 2018-03-29 PROCEDURE — 93017 CV STRESS TEST TRACING ONLY: CPT

## 2018-03-29 PROCEDURE — G0378 HOSPITAL OBSERVATION PER HR: HCPCS

## 2018-03-29 PROCEDURE — 80061 LIPID PANEL: CPT

## 2018-03-29 PROCEDURE — 71046 X-RAY EXAM CHEST 2 VIEWS: CPT

## 2018-03-29 PROCEDURE — 80048 BASIC METABOLIC PNL TOTAL CA: CPT

## 2018-03-29 PROCEDURE — 80307 DRUG TEST PRSMV CHEM ANLYZR: CPT

## 2018-03-29 PROCEDURE — 36415 COLL VENOUS BLD VENIPUNCTURE: CPT

## 2018-03-29 PROCEDURE — 99285 EMERGENCY DEPT VISIT HI MDM: CPT

## 2018-03-29 PROCEDURE — 85025 COMPLETE CBC W/AUTO DIFF WBC: CPT

## 2018-03-29 PROCEDURE — 83880 ASSAY OF NATRIURETIC PEPTIDE: CPT

## 2018-03-29 PROCEDURE — 96375 TX/PRO/DX INJ NEW DRUG ADDON: CPT

## 2018-03-29 PROCEDURE — C9898 INPNT STAY RADIOLABELED ITEM: HCPCS

## 2018-03-29 PROCEDURE — 93306 TTE W/DOPPLER COMPLETE: CPT

## 2018-03-29 PROCEDURE — 82040 ASSAY OF SERUM ALBUMIN: CPT

## 2018-03-29 PROCEDURE — 84484 ASSAY OF TROPONIN QUANT: CPT

## 2018-03-29 PROCEDURE — 96361 HYDRATE IV INFUSION ADD-ON: CPT

## 2018-03-29 PROCEDURE — 96374 THER/PROPH/DIAG INJ IV PUSH: CPT

## 2018-03-29 RX ADMIN — FOLIC ACID SCH MG: 1 TABLET ORAL at 23:44

## 2018-03-29 RX ADMIN — ACETAMINOPHEN PRN MG: 325 TABLET, FILM COATED ORAL at 20:14

## 2018-03-29 RX ADMIN — Medication SCH TAB: at 23:44

## 2018-03-29 RX ADMIN — NITROGLYCERIN PRN MG: 0.4 TABLET SUBLINGUAL at 22:08

## 2018-03-29 RX ADMIN — Medication SCH MG: at 23:44

## 2018-03-30 VITALS — DIASTOLIC BLOOD PRESSURE: 100 MMHG | SYSTOLIC BLOOD PRESSURE: 156 MMHG

## 2018-03-30 VITALS — DIASTOLIC BLOOD PRESSURE: 85 MMHG | SYSTOLIC BLOOD PRESSURE: 153 MMHG

## 2018-03-30 VITALS — DIASTOLIC BLOOD PRESSURE: 104 MMHG | SYSTOLIC BLOOD PRESSURE: 161 MMHG

## 2018-03-30 VITALS — SYSTOLIC BLOOD PRESSURE: 170 MMHG | DIASTOLIC BLOOD PRESSURE: 102 MMHG

## 2018-03-30 VITALS — DIASTOLIC BLOOD PRESSURE: 74 MMHG | SYSTOLIC BLOOD PRESSURE: 151 MMHG

## 2018-03-30 VITALS — SYSTOLIC BLOOD PRESSURE: 151 MMHG | DIASTOLIC BLOOD PRESSURE: 87 MMHG

## 2018-03-30 LAB
CHOL/HDL RATIO: 2.2
HDL CHOL %: 47 % (ref 26–37)
HDL CHOLESTEROL (DIRECT): 120 MG/DL (ref 40–60)
LDL CHOLESTEROL,CALCULATED: 124 MG/DL (ref 54–169)
LDLC/HDLC SERPL: 1 {RATIO} (ref 0.5–3)
TRIGL SERPL-MCNC: 72 MG/DL (ref 50–200)
TROPONIN I SERPL-MCNC: < 0.015 NG/ML (ref 0–0.04)
VLDLC SERPL CALC-MCNC: 14 MG/DL (ref 0–25)

## 2018-03-30 RX ADMIN — Medication SCH MG: at 08:24

## 2018-03-30 RX ADMIN — NITROGLYCERIN PRN MG: 0.4 TABLET SUBLINGUAL at 12:37

## 2018-03-30 RX ADMIN — ACETAMINOPHEN PRN MG: 325 TABLET, FILM COATED ORAL at 15:17

## 2018-03-30 RX ADMIN — Medication SCH TAB: at 08:25

## 2018-03-30 RX ADMIN — FOLIC ACID SCH MG: 1 TABLET ORAL at 08:24

## 2018-03-31 ENCOUNTER — HOSPITAL ENCOUNTER (EMERGENCY)
Facility: MEDICAL CENTER | Age: 48
End: 2018-03-31
Attending: EMERGENCY MEDICINE
Payer: MEDICAID

## 2018-03-31 VITALS
DIASTOLIC BLOOD PRESSURE: 89 MMHG | SYSTOLIC BLOOD PRESSURE: 131 MMHG | RESPIRATION RATE: 18 BRPM | OXYGEN SATURATION: 98 % | TEMPERATURE: 97.6 F | BODY MASS INDEX: 22.5 KG/M2 | WEIGHT: 157.19 LBS | HEART RATE: 118 BPM | HEIGHT: 70 IN

## 2018-03-31 PROCEDURE — 302449 STATCHG TRIAGE ONLY (STATISTIC)

## 2018-03-31 ASSESSMENT — PAIN SCALES - GENERAL
PAINLEVEL_OUTOF10: 7
PAINLEVEL_OUTOF10: 7

## 2018-03-31 ASSESSMENT — LIFESTYLE VARIABLES
TOTAL SCORE: 2
HAVE YOU EVER FELT YOU SHOULD CUT DOWN ON YOUR DRINKING: YES
DOES PATIENT WANT TO STOP DRINKING: NO
CONSUMPTION TOTAL: INCOMPLETE
EVER HAD A DRINK FIRST THING IN THE MORNING TO STEADY YOUR NERVES TO GET RID OF A HANGOVER: NO
ON A TYPICAL DAY WHEN YOU DRINK ALCOHOL HOW MANY DRINKS DO YOU HAVE: 3
EVER FELT BAD OR GUILTY ABOUT YOUR DRINKING: YES
DO YOU DRINK ALCOHOL: YES
TOTAL SCORE: 2
AVERAGE NUMBER OF DAYS PER WEEK YOU HAVE A DRINK CONTAINING ALCOHOL: 7
TOTAL SCORE: 2
HAVE PEOPLE ANNOYED YOU BY CRITICIZING YOUR DRINKING: NO

## 2018-03-31 NOTE — ED NOTES
Pt removed bandage from thumb and amb with steady gait to front lobby. Bleeding had been controlled at this time. Pt would not wait for ERP to see at this time.

## 2018-03-31 NOTE — ED TRIAGE NOTES
"  Chief Complaint   Patient presents with   • Laceration     R thumb with kitchen knife.     BIB [facility] from home after above. Per patient and [facility], \"it was squirting\" upon arrival.  Bleeding controlled with pressure dressing.  Lac to top of R thumb, CSM intact.     Last Tetanus 5 years ago.    Explained wait time and triage process to pt. Pt placed back out in lobby, told to notify ED tech or triage RN of any changes, verbalized understanding.      ./89   Pulse (!) 118   Temp 36.4 °C (97.6 °F) (Temporal)   Resp 18   Ht 1.778 m (5' 10\")   Wt 71.3 kg (157 lb 3 oz)   SpO2 98%   BMI 22.55 kg/m²     "

## 2018-03-31 NOTE — ED NOTES
"Pt amb to room. Agree with triage note. Chart up for ERP. Pt stated that he was discharged from [facility] on 3/30, went home and had approx 3 beers to \"catch up\" in order to not withdraw. Pt stated he was admitted to [facility] for new onset CHF, but did not  medications prescribed.   "